# Patient Record
Sex: FEMALE | Race: BLACK OR AFRICAN AMERICAN | Employment: OTHER | URBAN - METROPOLITAN AREA
[De-identification: names, ages, dates, MRNs, and addresses within clinical notes are randomized per-mention and may not be internally consistent; named-entity substitution may affect disease eponyms.]

---

## 2017-06-15 ENCOUNTER — HOSPITAL ENCOUNTER (OUTPATIENT)
Dept: MAMMOGRAPHY | Age: 82
Discharge: HOME | End: 2017-06-15
Attending: FAMILY MEDICINE
Payer: MEDICARE

## 2017-06-15 DIAGNOSIS — Z12.31 VISIT FOR SCREENING MAMMOGRAM: ICD-10-CM

## 2017-06-15 PROCEDURE — 77067 SCR MAMMO BI INCL CAD: CPT

## 2018-01-02 ENCOUNTER — HOSPITAL ENCOUNTER (OUTPATIENT)
Dept: VASCULAR SURGERY | Age: 83
Discharge: HOME | End: 2018-01-02
Attending: FAMILY MEDICINE
Payer: MEDICARE

## 2018-01-02 DIAGNOSIS — I65.29 CAROTID ARTERY STENOSIS: ICD-10-CM

## 2018-01-02 PROCEDURE — 93880 EXTRACRANIAL BILAT STUDY: CPT

## 2018-01-02 NOTE — PROCEDURES
[facility]  *** FINAL REPORT ***    Name: Dora Talavera  MRN: [id number]    Outpatient  : 06 Mar 1934  HIS Order #: [id number]  [id number] [facility] Visit #: [id number]  Date: 2018    TYPE OF TEST: Cerebrovascular Duplex    REASON FOR TEST  Carotid artery stenosis    Right Carotid:-             Proximal               Mid                 Distal  cm/s  Systolic  Diastolic  Systolic  Diastolic  Systolic  Diastolic  CCA:     50.2                                      67.0  Bulb:  ECA:     62.0  ICA:     47.0      13.0                            67.0      22.0  ICA/CCA:  0.7    ICA Stenosis:    Right Vertebral:-  Finding: Antegrade  Sys:       76.0  Francesca:        9.0    Right Subclavian:    Left Carotid:-            Proximal                Mid                 Distal  cm/s  Systolic  Diastolic  Systolic  Diastolic  Systolic  Diastolic  CCA:     47.6                                      68.0  Bulb:  ECA:     67.0  ICA:     47.0      11.0                            88.0      24.0  ICA/CCA:  0.7    ICA Stenosis:    Left Vertebral:-  Finding: Antegrade  Sys:       55.0  Francesca:       13.0    Left Subclavian:    INTERPRETATION/FINDINGS  PROCEDURE:  Color duplex ultrasound imaging of extracranial  cerebrovascular arteries. FINDINGS:       Right:  Internal carotid velocity is within normal limits. There   is minimal narrowing of the internal carotid flow channel on color  Doppler imaging and minimal plaque at the internal carotid origin on  B-mode imaging, consistent with less than 50 percent stenosis (lower  portion of the 0 to 49 percent range). The common and external  carotid arteries are patent and without evidence of hemodynamically  significant stenosis. Left:  Internal carotid velocity is within normal limits, there  is no observed narrowing of the flow channel on color Doppler imaging,   and no plaque is visualized on B-mode imaging.   The common and  external carotid arteries are patent and without evidence of  hemodynamically significant stenosis. IMPRESSION:  Consistent with minimal or no stenosis of the right  internal carotid and no stenosis of the left internal carotid. Vertebrals are patent with antegrade flow. ADDITIONAL COMMENTS    I have personally reviewed the data relevant to the interpretation of  this  study.     TECHNOLOGIST: Lillie Nicole RVT  Signed: 01/02/2018 09:57 AM    PHYSICIAN: Mechelle Noble MD  Signed: 01/02/2018 12:18 PM

## 2018-06-19 ENCOUNTER — HOSPITAL ENCOUNTER (OUTPATIENT)
Dept: MAMMOGRAPHY | Age: 83
Discharge: HOME | End: 2018-06-19
Attending: FAMILY MEDICINE
Payer: MEDICARE

## 2018-06-19 DIAGNOSIS — Z12.31 VISIT FOR SCREENING MAMMOGRAM: ICD-10-CM

## 2018-06-19 PROCEDURE — 77063 BREAST TOMOSYNTHESIS BI: CPT

## 2019-07-11 ENCOUNTER — HOSPITAL ENCOUNTER (OUTPATIENT)
Dept: MAMMOGRAPHY | Age: 84
Discharge: HOME | End: 2019-07-11
Attending: FAMILY MEDICINE
Payer: MEDICARE

## 2019-07-11 DIAGNOSIS — Z12.39 SCREENING BREAST EXAMINATION: ICD-10-CM

## 2019-07-11 PROCEDURE — 77063 BREAST TOMOSYNTHESIS BI: CPT

## 2020-07-23 ENCOUNTER — HOSPITAL ENCOUNTER (OUTPATIENT)
Dept: MAMMOGRAPHY | Age: 85
Discharge: HOME | End: 2020-07-23
Attending: FAMILY MEDICINE
Payer: MEDICARE

## 2020-07-23 DIAGNOSIS — Z12.31 VISIT FOR SCREENING MAMMOGRAM: ICD-10-CM

## 2020-07-23 PROCEDURE — 77067 SCR MAMMO BI INCL CAD: CPT

## 2021-07-12 ENCOUNTER — TRANSCRIBE ORDER (OUTPATIENT)
Dept: SCHEDULING | Age: 86
End: 2021-07-12

## 2021-07-12 DIAGNOSIS — Z12.31 VISIT FOR SCREENING MAMMOGRAM: Primary | ICD-10-CM

## 2021-09-21 ENCOUNTER — HOSPITAL ENCOUNTER (OUTPATIENT)
Dept: MAMMOGRAPHY | Age: 86
Discharge: HOME | End: 2021-09-21
Attending: FAMILY MEDICINE
Payer: MEDICARE

## 2021-09-21 DIAGNOSIS — Z12.31 VISIT FOR SCREENING MAMMOGRAM: ICD-10-CM

## 2021-09-21 PROCEDURE — 77063 BREAST TOMOSYNTHESIS BI: CPT

## 2022-04-05 ENCOUNTER — HOSPITAL ENCOUNTER (INPATIENT)
Age: 87
LOS: 8 days | Discharge: NURSING, REHAB OR LONG-TERM CARE | DRG: 326 | End: 2022-04-14
Attending: EMERGENCY MEDICINE | Admitting: SURGERY
Payer: MEDICARE

## 2022-04-05 ENCOUNTER — APPOINTMENT (OUTPATIENT)
Dept: CT IMAGING | Age: 87
DRG: 326 | End: 2022-04-05
Attending: EMERGENCY MEDICINE
Payer: MEDICARE

## 2022-04-05 DIAGNOSIS — E86.0 MODERATE DEHYDRATION: ICD-10-CM

## 2022-04-05 DIAGNOSIS — R62.7 FAILURE TO THRIVE IN ADULT: ICD-10-CM

## 2022-04-05 DIAGNOSIS — K66.8 PNEUMOPERITONEUM: Primary | ICD-10-CM

## 2022-04-05 DIAGNOSIS — E83.52 HYPERCALCEMIA: ICD-10-CM

## 2022-04-05 PROCEDURE — 81001 URINALYSIS AUTO W/SCOPE: CPT

## 2022-04-05 PROCEDURE — 74176 CT ABD & PELVIS W/O CONTRAST: CPT

## 2022-04-05 PROCEDURE — 96374 THER/PROPH/DIAG INJ IV PUSH: CPT

## 2022-04-05 PROCEDURE — 96360 HYDRATION IV INFUSION INIT: CPT

## 2022-04-05 PROCEDURE — 99285 EMERGENCY DEPT VISIT HI MDM: CPT

## 2022-04-05 PROCEDURE — 96361 HYDRATE IV INFUSION ADD-ON: CPT

## 2022-04-05 PROCEDURE — 93005 ELECTROCARDIOGRAM TRACING: CPT

## 2022-04-05 PROCEDURE — 70450 CT HEAD/BRAIN W/O DYE: CPT

## 2022-04-06 ENCOUNTER — ANESTHESIA EVENT (OUTPATIENT)
Dept: SURGERY | Age: 87
DRG: 326 | End: 2022-04-06
Payer: MEDICARE

## 2022-04-06 ENCOUNTER — ANESTHESIA (OUTPATIENT)
Dept: SURGERY | Age: 87
DRG: 326 | End: 2022-04-06
Payer: MEDICARE

## 2022-04-06 ENCOUNTER — APPOINTMENT (OUTPATIENT)
Dept: GENERAL RADIOLOGY | Age: 87
DRG: 326 | End: 2022-04-06
Attending: SURGERY
Payer: MEDICARE

## 2022-04-06 ENCOUNTER — APPOINTMENT (OUTPATIENT)
Dept: GENERAL RADIOLOGY | Age: 87
DRG: 326 | End: 2022-04-06
Attending: EMERGENCY MEDICINE
Payer: MEDICARE

## 2022-04-06 PROBLEM — K25.5 PERFORATED GASTRIC ULCER (HCC): Status: ACTIVE | Noted: 2022-04-06

## 2022-04-06 LAB
ALBUMIN SERPL-MCNC: 3.7 G/DL (ref 3.5–5)
ALBUMIN/GLOB SERPL: 0.9 {RATIO} (ref 1.1–2.2)
ALP SERPL-CCNC: 52 U/L (ref 45–117)
ALT SERPL-CCNC: 26 U/L (ref 12–78)
ANION GAP SERPL CALC-SCNC: 16 MMOL/L (ref 5–15)
APPEARANCE UR: CLEAR
AST SERPL-CCNC: 34 U/L (ref 15–37)
ATRIAL RATE: 71 BPM
BACTERIA URNS QL MICRO: NEGATIVE /HPF
BASOPHILS # BLD: 0 K/UL (ref 0–0.1)
BASOPHILS NFR BLD: 0 % (ref 0–1)
BILIRUB SERPL-MCNC: 0.5 MG/DL (ref 0.2–1)
BILIRUB UR QL: NEGATIVE
BUN SERPL-MCNC: 24 MG/DL (ref 6–20)
BUN/CREAT SERPL: 17 (ref 12–20)
CA-I BLD-SCNC: 1.08 MMOL/L (ref 1.12–1.32)
CALCIUM SERPL-MCNC: 10.6 MG/DL (ref 8.5–10.1)
CALCIUM SERPL-MCNC: 10.8 MG/DL (ref 8.5–10.1)
CALCULATED P AXIS, ECG09: 54 DEGREES
CALCULATED R AXIS, ECG10: -63 DEGREES
CALCULATED T AXIS, ECG11: 112 DEGREES
CHLORIDE SERPL-SCNC: 102 MMOL/L (ref 97–108)
CO2 SERPL-SCNC: 27 MMOL/L (ref 21–32)
COLOR UR: ABNORMAL
COMMENT, HOLDF: NORMAL
COVID-19 RAPID TEST, COVR: NOT DETECTED
CREAT SERPL-MCNC: 1.38 MG/DL (ref 0.55–1.02)
DIAGNOSIS, 93000: NORMAL
DIFFERENTIAL METHOD BLD: ABNORMAL
EOSINOPHIL # BLD: 0.1 K/UL (ref 0–0.4)
EOSINOPHIL NFR BLD: 1 % (ref 0–7)
EPITH CASTS URNS QL MICRO: ABNORMAL /LPF
ERYTHROCYTE [DISTWIDTH] IN BLOOD BY AUTOMATED COUNT: 14.1 % (ref 11.5–14.5)
GLOBULIN SER CALC-MCNC: 4.1 G/DL (ref 2–4)
GLUCOSE BLD STRIP.AUTO-MCNC: 134 MG/DL (ref 65–117)
GLUCOSE SERPL-MCNC: 79 MG/DL (ref 65–100)
GLUCOSE UR STRIP.AUTO-MCNC: NEGATIVE MG/DL
HCT VFR BLD AUTO: 42.1 % (ref 35–47)
HGB BLD-MCNC: 13.3 G/DL (ref 11.5–16)
HGB UR QL STRIP: NEGATIVE
HYALINE CASTS URNS QL MICRO: ABNORMAL /LPF (ref 0–5)
IMM GRANULOCYTES # BLD AUTO: 0 K/UL (ref 0–0.04)
IMM GRANULOCYTES NFR BLD AUTO: 0 % (ref 0–0.5)
KETONES UR QL STRIP.AUTO: 15 MG/DL
LACTATE SERPL-SCNC: 2.1 MMOL/L (ref 0.4–2)
LEUKOCYTE ESTERASE UR QL STRIP.AUTO: NEGATIVE
LIPASE SERPL-CCNC: 186 U/L (ref 73–393)
LYMPHOCYTES # BLD: 1.1 K/UL (ref 0.8–3.5)
LYMPHOCYTES NFR BLD: 16 % (ref 12–49)
MAGNESIUM SERPL-MCNC: 1.8 MG/DL (ref 1.6–2.4)
MCH RBC QN AUTO: 29.7 PG (ref 26–34)
MCHC RBC AUTO-ENTMCNC: 31.6 G/DL (ref 30–36.5)
MCV RBC AUTO: 94 FL (ref 80–99)
MONOCYTES # BLD: 0.3 K/UL (ref 0–1)
MONOCYTES NFR BLD: 4 % (ref 5–13)
MUCOUS THREADS URNS QL MICRO: ABNORMAL /LPF
NEUTS SEG # BLD: 5.8 K/UL (ref 1.8–8)
NEUTS SEG NFR BLD: 79 % (ref 32–75)
NITRITE UR QL STRIP.AUTO: NEGATIVE
NRBC # BLD: 0 K/UL (ref 0–0.01)
NRBC BLD-RTO: 0 PER 100 WBC
P-R INTERVAL, ECG05: 160 MS
PH UR STRIP: 5.5 [PH] (ref 5–8)
PHOSPHATE SERPL-MCNC: 4.2 MG/DL (ref 2.6–4.7)
PLATELET # BLD AUTO: 214 K/UL (ref 150–400)
PMV BLD AUTO: 10.6 FL (ref 8.9–12.9)
POTASSIUM SERPL-SCNC: 3.5 MMOL/L (ref 3.5–5.1)
PROT SERPL-MCNC: 7.8 G/DL (ref 6.4–8.2)
PROT UR STRIP-MCNC: ABNORMAL MG/DL
PTH-INTACT SERPL-MCNC: 49.4 PG/ML (ref 18.4–88)
Q-T INTERVAL, ECG07: 512 MS
QRS DURATION, ECG06: 176 MS
QTC CALCULATION (BEZET), ECG08: 556 MS
RBC # BLD AUTO: 4.48 M/UL (ref 3.8–5.2)
RBC #/AREA URNS HPF: ABNORMAL /HPF (ref 0–5)
SAMPLES BEING HELD,HOLD: NORMAL
SERVICE CMNT-IMP: ABNORMAL
SODIUM SERPL-SCNC: 145 MMOL/L (ref 136–145)
SOURCE, COVRS: NORMAL
SP GR UR REFRACTOMETRY: >1.03 (ref 1–1.03)
TROPONIN-HIGH SENSITIVITY: 42 NG/L (ref 0–51)
TSH SERPL DL<=0.05 MIU/L-ACNC: 2.1 UIU/ML (ref 0.36–3.74)
UR CULT HOLD, URHOLD: NORMAL
UROBILINOGEN UR QL STRIP.AUTO: 0.2 EU/DL (ref 0.2–1)
VENTRICULAR RATE, ECG03: 71 BPM
WBC # BLD AUTO: 7.4 K/UL (ref 3.6–11)
WBC URNS QL MICRO: ABNORMAL /HPF (ref 0–4)

## 2022-04-06 PROCEDURE — C9113 INJ PANTOPRAZOLE SODIUM, VIA: HCPCS

## 2022-04-06 PROCEDURE — 77030002966 HC SUT PDS J&J -A: Performed by: SURGERY

## 2022-04-06 PROCEDURE — 77030003666 HC NDL SPINAL BD -A: Performed by: SURGERY

## 2022-04-06 PROCEDURE — 77030003029 HC SUT VCRL J&J -B: Performed by: SURGERY

## 2022-04-06 PROCEDURE — 83605 ASSAY OF LACTIC ACID: CPT

## 2022-04-06 PROCEDURE — 77030040361 HC SLV COMPR DVT MDII -B: Performed by: SURGERY

## 2022-04-06 PROCEDURE — 77030008462 HC STPLR SKN PROX J&J -A: Performed by: SURGERY

## 2022-04-06 PROCEDURE — 76060000034 HC ANESTHESIA 1.5 TO 2 HR: Performed by: SURGERY

## 2022-04-06 PROCEDURE — 74011000250 HC RX REV CODE- 250: Performed by: SURGERY

## 2022-04-06 PROCEDURE — 71046 X-RAY EXAM CHEST 2 VIEWS: CPT

## 2022-04-06 PROCEDURE — 49905 OMENTAL FLAP INTRA-ABDOM: CPT | Performed by: SURGERY

## 2022-04-06 PROCEDURE — 74011250637 HC RX REV CODE- 250/637: Performed by: SURGERY

## 2022-04-06 PROCEDURE — 84443 ASSAY THYROID STIM HORMONE: CPT

## 2022-04-06 PROCEDURE — 74011250636 HC RX REV CODE- 250/636: Performed by: NURSE ANESTHETIST, CERTIFIED REGISTERED

## 2022-04-06 PROCEDURE — 77030013079 HC BLNKT BAIR HGGR 3M -A: Performed by: NURSE ANESTHETIST, CERTIFIED REGISTERED

## 2022-04-06 PROCEDURE — 74011000258 HC RX REV CODE- 258: Performed by: EMERGENCY MEDICINE

## 2022-04-06 PROCEDURE — 83690 ASSAY OF LIPASE: CPT

## 2022-04-06 PROCEDURE — 83735 ASSAY OF MAGNESIUM: CPT

## 2022-04-06 PROCEDURE — 84484 ASSAY OF TROPONIN QUANT: CPT

## 2022-04-06 PROCEDURE — 74011250636 HC RX REV CODE- 250/636: Performed by: EMERGENCY MEDICINE

## 2022-04-06 PROCEDURE — 77030010507 HC ADH SKN DERMBND J&J -B: Performed by: SURGERY

## 2022-04-06 PROCEDURE — 0DU607Z SUPPLEMENT STOMACH WITH AUTOLOGOUS TISSUE SUBSTITUTE, OPEN APPROACH: ICD-10-PCS | Performed by: SURGERY

## 2022-04-06 PROCEDURE — 74011000250 HC RX REV CODE- 250: Performed by: ANESTHESIOLOGY

## 2022-04-06 PROCEDURE — 77030026438 HC STYL ET INTUB CARD -A: Performed by: NURSE ANESTHETIST, CERTIFIED REGISTERED

## 2022-04-06 PROCEDURE — C9113 INJ PANTOPRAZOLE SODIUM, VIA: HCPCS | Performed by: SURGERY

## 2022-04-06 PROCEDURE — 85025 COMPLETE CBC W/AUTO DIFF WBC: CPT

## 2022-04-06 PROCEDURE — 77030008602 HC TRCR ENDOSC EPATH J&J -B: Performed by: SURGERY

## 2022-04-06 PROCEDURE — 82962 GLUCOSE BLOOD TEST: CPT

## 2022-04-06 PROCEDURE — 77030031139 HC SUT VCRL2 J&J -A: Performed by: SURGERY

## 2022-04-06 PROCEDURE — 76210000006 HC OR PH I REC 0.5 TO 1 HR: Performed by: SURGERY

## 2022-04-06 PROCEDURE — 43840 GSTRRPHY SUTR DUOL/GSTR ULCR: CPT | Performed by: SURGERY

## 2022-04-06 PROCEDURE — 77030039895 HC SYST SMK EVAC LAP COVD -B: Performed by: SURGERY

## 2022-04-06 PROCEDURE — 74011250636 HC RX REV CODE- 250/636

## 2022-04-06 PROCEDURE — 86677 HELICOBACTER PYLORI ANTIBODY: CPT

## 2022-04-06 PROCEDURE — 77030040504 HC DRN WND MDII -B: Performed by: SURGERY

## 2022-04-06 PROCEDURE — 80053 COMPREHEN METABOLIC PANEL: CPT

## 2022-04-06 PROCEDURE — 77030008603 HC TRCR ENDOSC EPATH J&J -C: Performed by: SURGERY

## 2022-04-06 PROCEDURE — 77030040506 HC DRN WND MDII -A: Performed by: SURGERY

## 2022-04-06 PROCEDURE — 87635 SARS-COV-2 COVID-19 AMP PRB: CPT

## 2022-04-06 PROCEDURE — 77030002916 HC SUT ETHLN J&J -A: Performed by: SURGERY

## 2022-04-06 PROCEDURE — 76010000149 HC OR TIME 1 TO 1.5 HR: Performed by: SURGERY

## 2022-04-06 PROCEDURE — 74011000258 HC RX REV CODE- 258: Performed by: SURGERY

## 2022-04-06 PROCEDURE — 77030008684 HC TU ET CUF COVD -B: Performed by: NURSE ANESTHETIST, CERTIFIED REGISTERED

## 2022-04-06 PROCEDURE — 83970 ASSAY OF PARATHORMONE: CPT

## 2022-04-06 PROCEDURE — 74011250636 HC RX REV CODE- 250/636: Performed by: ANESTHESIOLOGY

## 2022-04-06 PROCEDURE — 84100 ASSAY OF PHOSPHORUS: CPT

## 2022-04-06 PROCEDURE — 77030008771 HC TU NG SALEM SUMP -A: Performed by: NURSE ANESTHETIST, CERTIFIED REGISTERED

## 2022-04-06 PROCEDURE — 82330 ASSAY OF CALCIUM: CPT

## 2022-04-06 PROCEDURE — 99221 1ST HOSP IP/OBS SF/LOW 40: CPT | Performed by: SURGERY

## 2022-04-06 PROCEDURE — 65270000032 HC RM SEMIPRIVATE

## 2022-04-06 PROCEDURE — 74011250636 HC RX REV CODE- 250/636: Performed by: SURGERY

## 2022-04-06 PROCEDURE — 36415 COLL VENOUS BLD VENIPUNCTURE: CPT

## 2022-04-06 PROCEDURE — 2709999900 HC NON-CHARGEABLE SUPPLY: Performed by: SURGERY

## 2022-04-06 PROCEDURE — 74018 RADEX ABDOMEN 1 VIEW: CPT

## 2022-04-06 PROCEDURE — 77030002933 HC SUT MCRYL J&J -A: Performed by: SURGERY

## 2022-04-06 PROCEDURE — 74011000250 HC RX REV CODE- 250: Performed by: NURSE ANESTHETIST, CERTIFIED REGISTERED

## 2022-04-06 RX ORDER — DEXTROSE, SODIUM CHLORIDE, SODIUM LACTATE, POTASSIUM CHLORIDE, AND CALCIUM CHLORIDE 5; .6; .31; .03; .02 G/100ML; G/100ML; G/100ML; G/100ML; G/100ML
125 INJECTION, SOLUTION INTRAVENOUS CONTINUOUS
Status: DISCONTINUED | OUTPATIENT
Start: 2022-04-06 | End: 2022-04-06 | Stop reason: HOSPADM

## 2022-04-06 RX ORDER — ONDANSETRON 2 MG/ML
4 INJECTION INTRAMUSCULAR; INTRAVENOUS AS NEEDED
Status: DISCONTINUED | OUTPATIENT
Start: 2022-04-06 | End: 2022-04-06 | Stop reason: HOSPADM

## 2022-04-06 RX ORDER — SODIUM CHLORIDE 0.9 % (FLUSH) 0.9 %
5-40 SYRINGE (ML) INJECTION AS NEEDED
Status: DISCONTINUED | OUTPATIENT
Start: 2022-04-06 | End: 2022-04-14 | Stop reason: HOSPADM

## 2022-04-06 RX ORDER — ACETAMINOPHEN 325 MG/1
650 TABLET ORAL ONCE
Status: DISCONTINUED | OUTPATIENT
Start: 2022-04-06 | End: 2022-04-06 | Stop reason: HOSPADM

## 2022-04-06 RX ORDER — FLUCONAZOLE 2 MG/ML
400 INJECTION, SOLUTION INTRAVENOUS EVERY 24 HOURS
Status: DISCONTINUED | OUTPATIENT
Start: 2022-04-06 | End: 2022-04-06

## 2022-04-06 RX ORDER — SODIUM CHLORIDE 0.9 % (FLUSH) 0.9 %
5-40 SYRINGE (ML) INJECTION EVERY 8 HOURS
Status: DISCONTINUED | OUTPATIENT
Start: 2022-04-06 | End: 2022-04-06 | Stop reason: HOSPADM

## 2022-04-06 RX ORDER — LIDOCAINE HYDROCHLORIDE 10 MG/ML
0.5 INJECTION, SOLUTION EPIDURAL; INFILTRATION; INTRACAUDAL; PERINEURAL AS NEEDED
Status: DISCONTINUED | OUTPATIENT
Start: 2022-04-06 | End: 2022-04-06 | Stop reason: HOSPADM

## 2022-04-06 RX ORDER — SODIUM CHLORIDE 0.9 % (FLUSH) 0.9 %
5-40 SYRINGE (ML) INJECTION AS NEEDED
Status: DISCONTINUED | OUTPATIENT
Start: 2022-04-06 | End: 2022-04-06 | Stop reason: HOSPADM

## 2022-04-06 RX ORDER — ONDANSETRON 2 MG/ML
INJECTION INTRAMUSCULAR; INTRAVENOUS AS NEEDED
Status: DISCONTINUED | OUTPATIENT
Start: 2022-04-06 | End: 2022-04-06 | Stop reason: HOSPADM

## 2022-04-06 RX ORDER — MIDAZOLAM HYDROCHLORIDE 1 MG/ML
1 INJECTION, SOLUTION INTRAMUSCULAR; INTRAVENOUS
Status: DISCONTINUED | OUTPATIENT
Start: 2022-04-06 | End: 2022-04-06 | Stop reason: HOSPADM

## 2022-04-06 RX ORDER — MIDAZOLAM HYDROCHLORIDE 1 MG/ML
1 INJECTION, SOLUTION INTRAMUSCULAR; INTRAVENOUS AS NEEDED
Status: DISCONTINUED | OUTPATIENT
Start: 2022-04-06 | End: 2022-04-06 | Stop reason: HOSPADM

## 2022-04-06 RX ORDER — BUPIVACAINE HYDROCHLORIDE AND EPINEPHRINE 5; 5 MG/ML; UG/ML
INJECTION, SOLUTION EPIDURAL; INTRACAUDAL; PERINEURAL AS NEEDED
Status: DISCONTINUED | OUTPATIENT
Start: 2022-04-06 | End: 2022-04-06 | Stop reason: HOSPADM

## 2022-04-06 RX ORDER — FENTANYL CITRATE 50 UG/ML
25 INJECTION, SOLUTION INTRAMUSCULAR; INTRAVENOUS
Status: DISCONTINUED | OUTPATIENT
Start: 2022-04-06 | End: 2022-04-06 | Stop reason: HOSPADM

## 2022-04-06 RX ORDER — DIPHENHYDRAMINE HYDROCHLORIDE 50 MG/ML
12.5 INJECTION, SOLUTION INTRAMUSCULAR; INTRAVENOUS AS NEEDED
Status: DISCONTINUED | OUTPATIENT
Start: 2022-04-06 | End: 2022-04-06 | Stop reason: HOSPADM

## 2022-04-06 RX ORDER — SODIUM CHLORIDE, SODIUM LACTATE, POTASSIUM CHLORIDE, CALCIUM CHLORIDE 600; 310; 30; 20 MG/100ML; MG/100ML; MG/100ML; MG/100ML
125 INJECTION, SOLUTION INTRAVENOUS CONTINUOUS
Status: DISCONTINUED | OUTPATIENT
Start: 2022-04-06 | End: 2022-04-06 | Stop reason: HOSPADM

## 2022-04-06 RX ORDER — FENTANYL CITRATE 50 UG/ML
50 INJECTION, SOLUTION INTRAMUSCULAR; INTRAVENOUS AS NEEDED
Status: DISCONTINUED | OUTPATIENT
Start: 2022-04-06 | End: 2022-04-06 | Stop reason: HOSPADM

## 2022-04-06 RX ORDER — ONDANSETRON 2 MG/ML
4 INJECTION INTRAMUSCULAR; INTRAVENOUS
Status: DISCONTINUED | OUTPATIENT
Start: 2022-04-06 | End: 2022-04-14 | Stop reason: HOSPADM

## 2022-04-06 RX ORDER — DEXAMETHASONE SODIUM PHOSPHATE 4 MG/ML
INJECTION, SOLUTION INTRA-ARTICULAR; INTRALESIONAL; INTRAMUSCULAR; INTRAVENOUS; SOFT TISSUE AS NEEDED
Status: DISCONTINUED | OUTPATIENT
Start: 2022-04-06 | End: 2022-04-06 | Stop reason: HOSPADM

## 2022-04-06 RX ORDER — DEXTROSE, SODIUM CHLORIDE, AND POTASSIUM CHLORIDE 5; .45; .15 G/100ML; G/100ML; G/100ML
125 INJECTION INTRAVENOUS CONTINUOUS
Status: DISCONTINUED | OUTPATIENT
Start: 2022-04-06 | End: 2022-04-11

## 2022-04-06 RX ORDER — SODIUM CHLORIDE 0.9 % (FLUSH) 0.9 %
5-40 SYRINGE (ML) INJECTION EVERY 8 HOURS
Status: DISCONTINUED | OUTPATIENT
Start: 2022-04-06 | End: 2022-04-14 | Stop reason: HOSPADM

## 2022-04-06 RX ORDER — SODIUM CHLORIDE, SODIUM LACTATE, POTASSIUM CHLORIDE, CALCIUM CHLORIDE 600; 310; 30; 20 MG/100ML; MG/100ML; MG/100ML; MG/100ML
INJECTION, SOLUTION INTRAVENOUS
Status: DISCONTINUED | OUTPATIENT
Start: 2022-04-06 | End: 2022-04-06 | Stop reason: HOSPADM

## 2022-04-06 RX ORDER — ROCURONIUM BROMIDE 10 MG/ML
INJECTION, SOLUTION INTRAVENOUS AS NEEDED
Status: DISCONTINUED | OUTPATIENT
Start: 2022-04-06 | End: 2022-04-06 | Stop reason: HOSPADM

## 2022-04-06 RX ORDER — MORPHINE SULFATE 2 MG/ML
2-4 INJECTION, SOLUTION INTRAMUSCULAR; INTRAVENOUS
Status: DISCONTINUED | OUTPATIENT
Start: 2022-04-06 | End: 2022-04-14 | Stop reason: HOSPADM

## 2022-04-06 RX ORDER — LIDOCAINE HYDROCHLORIDE 20 MG/ML
INJECTION, SOLUTION EPIDURAL; INFILTRATION; INTRACAUDAL; PERINEURAL AS NEEDED
Status: DISCONTINUED | OUTPATIENT
Start: 2022-04-06 | End: 2022-04-06 | Stop reason: HOSPADM

## 2022-04-06 RX ORDER — OXYCODONE HYDROCHLORIDE 5 MG/1
5 TABLET ORAL AS NEEDED
Status: DISCONTINUED | OUTPATIENT
Start: 2022-04-06 | End: 2022-04-06 | Stop reason: HOSPADM

## 2022-04-06 RX ORDER — ACETAMINOPHEN 650 MG/1
650 SUPPOSITORY RECTAL
Status: DISCONTINUED | OUTPATIENT
Start: 2022-04-06 | End: 2022-04-14 | Stop reason: HOSPADM

## 2022-04-06 RX ORDER — FENTANYL CITRATE 50 UG/ML
25 INJECTION, SOLUTION INTRAMUSCULAR; INTRAVENOUS
Status: DISCONTINUED | OUTPATIENT
Start: 2022-04-06 | End: 2022-04-06

## 2022-04-06 RX ORDER — MORPHINE SULFATE 2 MG/ML
2 INJECTION, SOLUTION INTRAMUSCULAR; INTRAVENOUS
Status: DISCONTINUED | OUTPATIENT
Start: 2022-04-06 | End: 2022-04-06 | Stop reason: HOSPADM

## 2022-04-06 RX ORDER — ACETAMINOPHEN 325 MG/1
650 TABLET ORAL
Status: DISCONTINUED | OUTPATIENT
Start: 2022-04-06 | End: 2022-04-14 | Stop reason: HOSPADM

## 2022-04-06 RX ORDER — POLYETHYLENE GLYCOL 3350 17 G/17G
17 POWDER, FOR SOLUTION ORAL DAILY PRN
Status: DISCONTINUED | OUTPATIENT
Start: 2022-04-06 | End: 2022-04-14 | Stop reason: HOSPADM

## 2022-04-06 RX ORDER — PANTOPRAZOLE SODIUM 40 MG/10ML
INJECTION, POWDER, LYOPHILIZED, FOR SOLUTION INTRAVENOUS
Status: COMPLETED
Start: 2022-04-06 | End: 2022-04-06

## 2022-04-06 RX ORDER — PROMETHAZINE HYDROCHLORIDE 25 MG/1
12.5 TABLET ORAL
Status: DISCONTINUED | OUTPATIENT
Start: 2022-04-06 | End: 2022-04-14 | Stop reason: HOSPADM

## 2022-04-06 RX ORDER — FLUCONAZOLE 2 MG/ML
200 INJECTION, SOLUTION INTRAVENOUS EVERY 24 HOURS
Status: DISCONTINUED | OUTPATIENT
Start: 2022-04-07 | End: 2022-04-11

## 2022-04-06 RX ORDER — FENTANYL CITRATE 50 UG/ML
INJECTION, SOLUTION INTRAMUSCULAR; INTRAVENOUS AS NEEDED
Status: DISCONTINUED | OUTPATIENT
Start: 2022-04-06 | End: 2022-04-06 | Stop reason: HOSPADM

## 2022-04-06 RX ORDER — PROPOFOL 10 MG/ML
INJECTION, EMULSION INTRAVENOUS AS NEEDED
Status: DISCONTINUED | OUTPATIENT
Start: 2022-04-06 | End: 2022-04-06 | Stop reason: HOSPADM

## 2022-04-06 RX ORDER — ONDANSETRON 2 MG/ML
4 INJECTION INTRAMUSCULAR; INTRAVENOUS
Status: DISCONTINUED | OUTPATIENT
Start: 2022-04-06 | End: 2022-04-09 | Stop reason: SDUPTHER

## 2022-04-06 RX ADMIN — FENTANYL CITRATE 25 MCG: 0.05 INJECTION, SOLUTION INTRAMUSCULAR; INTRAVENOUS at 03:14

## 2022-04-06 RX ADMIN — PIPERACILLIN SODIUM AND TAZOBACTAM SODIUM 3.38 G: 3; 375 INJECTION, POWDER, FOR SOLUTION INTRAVENOUS at 18:05

## 2022-04-06 RX ADMIN — PIPERACILLIN SODIUM AND TAZOBACTAM SODIUM 3.38 G: 3; 375 INJECTION, POWDER, FOR SOLUTION INTRAVENOUS at 11:05

## 2022-04-06 RX ADMIN — PANTOPRAZOLE SODIUM 40 MG: 40 INJECTION, POWDER, FOR SOLUTION INTRAVENOUS at 05:09

## 2022-04-06 RX ADMIN — DEXAMETHASONE SODIUM PHOSPHATE 4 MG: 4 INJECTION, SOLUTION INTRAMUSCULAR; INTRAVENOUS at 05:41

## 2022-04-06 RX ADMIN — MORPHINE SULFATE 2 MG: 2 INJECTION, SOLUTION INTRAMUSCULAR; INTRAVENOUS at 19:00

## 2022-04-06 RX ADMIN — FLUCONAZOLE 400 MG: 400 INJECTION, SOLUTION INTRAVENOUS at 05:21

## 2022-04-06 RX ADMIN — DEXTROSE MONOHYDRATE, SODIUM CHLORIDE, AND POTASSIUM CHLORIDE 125 ML/HR: 50; 4.5; 1.49 INJECTION, SOLUTION INTRAVENOUS at 07:11

## 2022-04-06 RX ADMIN — PIPERACILLIN AND TAZOBACTAM 3.38 G: 3; .375 INJECTION, POWDER, LYOPHILIZED, FOR SOLUTION INTRAVENOUS at 05:10

## 2022-04-06 RX ADMIN — FENTANYL CITRATE 25 MCG: 50 INJECTION, SOLUTION INTRAMUSCULAR; INTRAVENOUS at 07:35

## 2022-04-06 RX ADMIN — ROCURONIUM BROMIDE 35 MG: 10 SOLUTION INTRAVENOUS at 05:30

## 2022-04-06 RX ADMIN — FENTANYL CITRATE 50 MCG: 50 INJECTION, SOLUTION INTRAMUSCULAR; INTRAVENOUS at 05:30

## 2022-04-06 RX ADMIN — DEXTROSE MONOHYDRATE, SODIUM CHLORIDE, AND POTASSIUM CHLORIDE 125 ML/HR: 50; 4.5; 1.49 INJECTION, SOLUTION INTRAVENOUS at 16:05

## 2022-04-06 RX ADMIN — FENTANYL CITRATE 25 MCG: 50 INJECTION, SOLUTION INTRAMUSCULAR; INTRAVENOUS at 08:13

## 2022-04-06 RX ADMIN — SODIUM CHLORIDE, PRESERVATIVE FREE 10 ML: 5 INJECTION INTRAVENOUS at 07:51

## 2022-04-06 RX ADMIN — SODIUM CHLORIDE 1000 ML: 9 INJECTION, SOLUTION INTRAVENOUS at 00:43

## 2022-04-06 RX ADMIN — MORPHINE SULFATE 2 MG: 2 INJECTION, SOLUTION INTRAMUSCULAR; INTRAVENOUS at 13:43

## 2022-04-06 RX ADMIN — MORPHINE SULFATE 2 MG: 2 INJECTION, SOLUTION INTRAMUSCULAR; INTRAVENOUS at 22:45

## 2022-04-06 RX ADMIN — ONDANSETRON HYDROCHLORIDE 4 MG: 2 SOLUTION INTRAMUSCULAR; INTRAVENOUS at 13:43

## 2022-04-06 RX ADMIN — FENTANYL CITRATE 50 MCG: 50 INJECTION, SOLUTION INTRAMUSCULAR; INTRAVENOUS at 05:58

## 2022-04-06 RX ADMIN — ACETAMINOPHEN 650 MG: 650 SUPPOSITORY RECTAL at 13:44

## 2022-04-06 RX ADMIN — FENTANYL CITRATE 25 MCG: 50 INJECTION, SOLUTION INTRAMUSCULAR; INTRAVENOUS at 07:21

## 2022-04-06 RX ADMIN — MORPHINE SULFATE 2 MG: 2 INJECTION, SOLUTION INTRAMUSCULAR; INTRAVENOUS at 16:15

## 2022-04-06 RX ADMIN — PROPOFOL 100 MG: 10 INJECTION, EMULSION INTRAVENOUS at 05:30

## 2022-04-06 RX ADMIN — SODIUM CHLORIDE, POTASSIUM CHLORIDE, SODIUM LACTATE AND CALCIUM CHLORIDE: 600; 310; 30; 20 INJECTION, SOLUTION INTRAVENOUS at 05:15

## 2022-04-06 RX ADMIN — SODIUM CHLORIDE, PRESERVATIVE FREE 40 MG: 5 INJECTION INTRAVENOUS at 16:13

## 2022-04-06 RX ADMIN — SUGAMMADEX 200 MG: 100 INJECTION, SOLUTION INTRAVENOUS at 06:32

## 2022-04-06 RX ADMIN — ACETAMINOPHEN 650 MG: 325 TABLET ORAL at 22:45

## 2022-04-06 RX ADMIN — PHENYLEPHRINE HYDROCHLORIDE 40 MCG/MIN: 10 INJECTION INTRAVENOUS at 05:43

## 2022-04-06 RX ADMIN — ONDANSETRON HYDROCHLORIDE 4 MG: 2 INJECTION, SOLUTION INTRAMUSCULAR; INTRAVENOUS at 05:41

## 2022-04-06 RX ADMIN — SODIUM CHLORIDE 1000 ML: 9 INJECTION, SOLUTION INTRAVENOUS at 03:00

## 2022-04-06 RX ADMIN — ROCURONIUM BROMIDE 5 MG: 10 SOLUTION INTRAVENOUS at 05:51

## 2022-04-06 RX ADMIN — LIDOCAINE HYDROCHLORIDE 60 MG: 20 INJECTION, SOLUTION EPIDURAL; INFILTRATION; INTRACAUDAL; PERINEURAL at 05:30

## 2022-04-06 RX ADMIN — SODIUM CHLORIDE, PRESERVATIVE FREE 5 ML: 5 INJECTION INTRAVENOUS at 14:00

## 2022-04-06 NOTE — PROGRESS NOTES
Day #1 of Fluconazole  Indication:  IAI  Current regimen:  400 mg IV Q 24hrs   Abx regimen: Fluconazole, Zosyn  Recent Labs     22  0040   WBC 7.4   CREA 1.38*   BUN 24*   Est CrCl: 21.3 ml/min   Temp (24hrs), Av °F (37.2 °C), Min:97.6 °F (36.4 °C), Max:101 °F (38.3 °C)    Plan: Change to 200 mg IV Q 24hrs with respect to indication and renal status (CrCl <50 mL/min) per [facility]/[employer]-approved Renal Dosing Adjustment protocol. Pharmacy will continue to monitor this patient daily for changes in clinical status and renal function.     Tashia Bhatti, PharmD  Clinical Pharmacist, Orthopedics and Med/Surg  [id number] [employer] ()

## 2022-04-06 NOTE — ED NOTES
Care assumed from Dr. Ximena Butcher at [facility] ED transferring for evaluation by general surgery, Dr. Stacey Polk. She became mildly hypoxic after some fentanyl dosing while in route to the ED but otherwise no acute events in transfer. On my evaluation she is tender across her lower abdomen but in no distress. General surgery was consulted and saw the patient at the bedside plans to admit.
Pt given fentanyl prior to transport for pain 7/10.
Pt resting quietly in bed with eyes closed. Denies pain at this time. Family remains at bedside.
Pt to CT scan via stretcher with ct tech
Pt to room from CT scan via stretcher.
TRANSFER - OUT REPORT:    Verbal report given to [facility] Cooks, RN on Johan Robb  being transferred to [facility] ED for urgent transfer       Report consisted of patients Situation, Background, Assessment and   Recommendations(SBAR). Information from the following report(s) ED Summary was reviewed with the receiving nurse. Lines:   Peripheral IV 04/06/22 Right Antecubital (Active)       Peripheral IV 04/06/22 Left Forearm (Active)   Site Assessment Clean, dry, & intact 04/06/22 0253   Phlebitis Assessment 0 04/06/22 0253   Infiltration Assessment 0 04/06/22 0253   Dressing Status Clean, dry, & intact 04/06/22 0253   Dressing Type Transparent 04/06/22 0253   Hub Color/Line Status Pink;Flushed 04/06/22 0253   Action Taken Blood drawn 04/06/22 0253   Alcohol Cap Used Yes 04/06/22 0253        Opportunity for questions and clarification was provided.       Patient transported with:   Monitor
TRANSFER - OUT REPORT:    Verbal report given to Peyman Seals RN(name) on Jorge Mc  being transferred to OR(unit) for ordered procedure       Report consisted of patients Situation, Background, Assessment and   Recommendations(SBAR). Information from the following report(s) SBAR, ED Summary and MAR was reviewed with the receiving nurse. Lines:   Peripheral IV 04/06/22 Right Antecubital (Active)       Peripheral IV 04/06/22 Left Forearm (Active)   Site Assessment Clean, dry, & intact 04/06/22 0253   Phlebitis Assessment 0 04/06/22 0253   Infiltration Assessment 0 04/06/22 0253   Dressing Status Clean, dry, & intact 04/06/22 0253   Dressing Type Transparent 04/06/22 0253   Hub Color/Line Status Pink;Flushed 04/06/22 0253   Action Taken Blood drawn 04/06/22 0253   Alcohol Cap Used Yes 04/06/22 0253        Opportunity for questions and clarification was provided.       Patient transported with:   US Toxicology
age(85 years old or older)/surgery

## 2022-04-06 NOTE — PERIOP NOTES
TRANSFER - OUT REPORT:    Verbal report given to Alicia(name) on Herbert Ferguson  being transferred to [facility](unit) for routine post - op       Report consisted of patients Situation, Background, Assessment and   Recommendations(SBAR). Time Pre op antibiotic given:0510 & 2683  Anesthesia Stop time: 4824  Woody Present on Transfer to floor:n  Order for Woody on Chart:n  Discharge Prescriptions with Chart:n    Information from the following report(s) SBAR, OR Summary, Intake/Output, MAR and Accordion was reviewed with the receiving nurse. Opportunity for questions and clarification was provided. Is the patient on 02? YES       L/Min 2       Other     Is the patient on a monitor? NO    Is the nurse transporting with the patient? NO    Surgical Waiting Area notified of patient's transfer from PACU? YES      The following personal items collected during your admission accompanied patient upon transfer:   Dental Appliance: Dental Appliances: Uppers,Lowers,With patient  Vision: Visual Aid: Glasses,At home  Hearing Aid:    Jewelry: Jewelry: None  Clothing: Clothing: None  Other Valuables:  Other Valuables: None  Valuables sent to safe: Personal Items Sent to Safe: n/a      Dentures to floor with pt

## 2022-04-06 NOTE — BRIEF OP NOTE
Brief Postoperative Note    Patient: Jem Womack  YOB: 1934  MRN: [id number]    Date of Procedure: 4/6/2022     Pre-Op Diagnosis: Perforated Ulcer    Post-Op Diagnosis: Perforated gastric ulcer      Procedure(s):  Open modified Grahams repair of perforated ulcer repair    Surgeon(s):  Jaime Robbins MD    Surgical Assistant: Surg Asst-1: Nereida WARNER    Anesthesia: General     Estimated Blood Loss (mL): less than 50     Complications: None    Specimens: * No specimens in log *     Implants: * No implants in log *    Drains:   Shelia Montano #1 04/06/22 Anterior;Right Abdomen (Active)       Findings: Perforated prepyloric ulcer likely. 3 mm hole. Repaired in modified Froylan's patch fashion. Drain placed over repair.     Electronically Signed by Kelvin Agrawal MD on 4/6/2022 at 6:52 AM

## 2022-04-06 NOTE — H&P
General Surgery History and Physical    CC: Abd pain    History of Present Illness:      Herbert Ferguson is a 80 y.o. female who presented with severe abdominal pain. Patient reports that over the last week or so she has had worsening abdominal pain that became acutely painful yesterday. Pain is sharp and diffuse 9/10 prompting visit to the ER. Nausea but no emesis. Pain radiates throughout abdomen but seems to be worse in epigastrium. No NSAID use. History of bleeding gastric ulcers in past while on blood thinners. No H pylori history. No history of cancer. Has early dementia but functional at home with daughter. Past Medical History:   Diagnosis Date    Arthritis     Dementia ([facility] [address]     Depression     Essential hypertension     Headache     High cholesterol     Hypertension     Pacemaker      Past Surgical History:   Procedure Laterality Date    HX GI      EGD/colonoscopy    HX GYN      hysterectomy      Family History   Problem Relation Age of Onset    Breast Cancer Sister 58    Cancer Brother         lung    Heart Failure Brother     Breast Cancer Sister 66    Heart Failure Sister      Social History     Socioeconomic History    Marital status:    Tobacco Use    Smoking status: Never Smoker    Smokeless tobacco: Never Used   Substance and Sexual Activity    Alcohol use: No    Drug use: No    Sexual activity: Not Currently      Prior to Admission medications    Medication Sig Start Date End Date Taking? Authorizing Provider   ondansetron hcl (ZOFRAN) 4 mg tablet Take 4 mg by mouth every eight (8) hours as needed for Nausea. Provider, Historical   amLODIPine (NORVASC) 5 mg tablet Take 5 mg by mouth daily. Provider, Historical   sertraline (ZOLOFT) 100 mg tablet Take  by mouth daily. Provider, Historical   leflunomide (ARAVA) 20 mg tablet Take 20 mg by mouth daily. Provider, Historical   denosumab (PROLIA) 60 mg/mL injection 60 mg by SubCUTAneous route.     Provider, Historical   CALCIUM CARBONATE/VITAMIN D3 (CALCIUM 600 + D,3, PO) Take 600 mg by mouth. Takes 2 per day     Provider, Historical   MULTIVITAMINS W-MINERALS/LUT (CENTRUM SILVER PO) Take  by mouth daily. Provider, Historical   OTHER Osteo bi-flex. Takes two po once a day. Provider, Historical     Allergies   Allergen Reactions    Bactrim [Sulfamethoprim] Rash       Review of Systems:  Constitutional: No fever or chills  Neurologic: No headache  Eyes: No scleral icterus or irritated eyes  Nose: No nasal pain or drainage  Mouth: No oral lesions or sore throat  Cardiac: No palpations or chest pain  Pulmonary: No cough or shortness of breath  Gastrointestinal: Abd pain and nausea, emesis, diarrhea, or constipation  Genitourinary: No dysuria  Musculoskeletal: No muscle or joint tenderness  Skin: No rashes or lesions  Psychiatric: No anxiety or depressed mood    Physical Exam:   Temp (24hrs), Av.6 °F (36.4 °C), Min:97.6 °F (36.4 °C), Max:97.6 °F (36.4 °C)      Visit Vitals  BP (!) 150/62   Pulse 83   Temp 97.6 °F (36.4 °C)   Resp 19   Ht 5' 1\" (1.549 m)   Wt 109 lb 9.1 oz (49.7 kg)   SpO2 93%   BMI 20.70 kg/m²     General: No acute distress, conversant  Eyes: PERRLA, no scleral icterus  HENT: Normocephalic without oral lesions  Neck: Trachea midline without LAD  Cardiac: Normal pulse rate and rhythm  Pulmonary: Symmetric chest rise with normal effort  GI: Soft, no distension, TTP in epigastrium, no hernia, no splenomegaly  Skin: Warm without rash  Extremities: No edema or joint stiffness  Psych: Appropriate mood and affect    Assessment:     79 y/o F with concern for perforated foregut hollow viscus. Plan:     Discuss with family the options which include operative repair with patch, supportive care, or palliative care given her age and dementia. The family and patient would like to proceed with surgical intervention. Plan for dx laparoscopy, repair of perforation, possible laparotomy.  We discussed the risks of bleeding, infection, leak, abscess, need for open surgery, and the risks of anesthesia.  The patient and daughter expressed understanding and elect to proceed urgently this AM.      Signed By: Aston Cook MD  Bariatric and General Surgeon  [facility]    April 6, 2022

## 2022-04-06 NOTE — ANESTHESIA PREPROCEDURE EVALUATION
Relevant Problems   No relevant active problems       Anesthetic History               Review of Systems / Medical History      Pulmonary                   Neuro/Psych              Cardiovascular    Hypertension                   GI/Hepatic/Renal                Endo/Other        Arthritis     Other Findings              Physical Exam    Airway  Mallampati: II  TM Distance: > 6 cm  Neck ROM: normal range of motion   Mouth opening: Normal     Cardiovascular  Regular rate and rhythm,  S1 and S2 normal,  no murmur, click, rub, or gallop             Dental  No notable dental hx       Pulmonary  Breath sounds clear to auscultation               Abdominal  GI exam deferred       Other Findings            Anesthetic Plan    ASA: 3, emergent  Anesthesia type: general          Induction: Intravenous  Anesthetic plan and risks discussed with: Patient

## 2022-04-06 NOTE — PERIOP NOTES
TRANSFER - IN REPORT:    Verbal report received from YusufRN(name) on Soniya Rodríguez  being received from ED(unit) for routine progression of care      Report consisted of patients Situation, Background, Assessment and   Recommendations(SBAR). Information from the following report(s) ED Summary, MAR and Procedure Verification was reviewed with the receiving nurse. Opportunity for questions and clarification was provided. Assessment completed upon patients arrival to unit and care assumed.

## 2022-04-06 NOTE — ANESTHESIA POSTPROCEDURE EVALUATION
Procedure(s):  LAPAROSCOPY GENERAL DIAGNOSTIC, perforated ulcer repair. general    Anesthesia Post Evaluation        Patient location during evaluation: PACU  Patient participation: complete - patient participated  Level of consciousness: awake and alert  Pain management: adequate  Airway patency: patent  Anesthetic complications: no  Cardiovascular status: acceptable  Respiratory status: acceptable  Hydration status: acceptable  Comments: I have seen and evaluated the patient and is ready for discharge. Alvarado Siegel MD    Post anesthesia nausea and vomiting:  none      INITIAL Post-op Vital signs:   Vitals Value Taken Time   /69 04/06/22 0705   Temp 36.7 °C (98.1 °F) 04/06/22 0655   Pulse 76 04/06/22 0707   Resp 19 04/06/22 0707   SpO2 99 % 04/06/22 0707   Vitals shown include unvalidated device data.

## 2022-04-06 NOTE — OP NOTES
PROCEDURE NOTE    Date of Procedure: 4/6/2022     Preoperative Diagnosis: Perforated hollow viscus  Postoperative Diagnosis: Gastric antral ulcer perforation    Procedure: Diagnostic laparoscopy, open laparotomy, suture repair of perforated gastric ulcer with omental patch and drain placement    Surgeon: Debby Randhawa MD    Surgical Staff: Circ-1: Yeni Chu RN; April Espinosa  Scrub Tech-1: Nichol Sterling  Surg Asst-1: Anthony WARNER      Anesthesia: General    Indications: 80-year-old female presents with worsening abdominal pain over the last week. Found to have free air on CT scan taken to the OR for exploration. She has been taking steroids intermittently according to her daughter. No NSAIDs or H. pylori known of. Findings: Moderate purulence throughout the foregut, 3 mm prepyloric antral perforated ulcer    Description of Operation: French Torres was identified. Informed consent was obtained after a complete discussion of risks, benefits and alternatives to surgery were had with the patient. The patient was placed in the supine position on the operating table with a foot board. The patient was then prepped and draped in the usual sterile fashion. The patient was injected with local anesthesia in the infraumbilical area. I performed a cutdown using an 11 blade, Guadalupe S rectractors, Kocher's. I safely entered the abdomen. Medially I noted a fair amount of purulence in the bilateral upper quadrants. I elected then to go open to fully explore her abdomen. We removed the trocar and made a upper midline laparotomy connecting this to the previously placed trocar site. Upon retracting the left lobe the liver we found purulent exudate over the gastric antrum. I examined this further and found a 3 mm punctate anterior surface antral perforated ulcer.   I quickly examined the remainder of the stomach and duodenum I did not find any further disease process or perforation. I then placed for interrupted 2-0 Vicryl Lembert sutures to repair this hole. I then mobilized the omentum and brought this over the transverse colon and placed this over my suture repair performing an omental patch. I tacked the omentum in several places to the duodenum and stomach with 3-0 Vicryl sutures. I then placed a 23 Wolof round FILEMON drain via the right upper quadrant over my repair. We irrigated the abdomen. We secured the drain in place with a 2-0 nylon. We then closed the midline fascia with running 0 PDS suture followed by irrigation of the subcutaneous tissue, and skin staples and an island dressing. At the end of the procedure all instrument, needle, and sponge counts were correct.   Patient was sent to PACU in stable condition      Estimated Blood Loss: 10 mL    Specimens: * No specimens in log *     Complications: None    Implants: * No implants in log *      Alfred Mandel MD  Bariatric and General Surgeon  [facility]  4/6/2022

## 2022-04-06 NOTE — ED PROVIDER NOTES
The history is provided by the patient. Abdominal Pain   This is a new problem. Episode onset: 1 week ago. The problem occurs daily. The problem has not changed since onset. Associated with: loss of appetite, poor oral intake, weight loss. The pain is located in the generalized abdominal region. The pain is mild. Associated symptoms include anorexia, diarrhea (chronic loose stools) and nausea. Pertinent negatives include no fever, no hematochezia, no melena, no vomiting, no dysuria, no frequency and no chest pain. Nothing worsens the pain. The pain is relieved by nothing. The patient's surgical history includes hysterectomy.        Past Medical History:   Diagnosis Date    Arthritis     Dementia ([address]     Depression     Essential hypertension     Headache     High cholesterol     Hypertension     Pacemaker        Past Surgical History:   Procedure Laterality Date    HX GI      EGD/colonoscopy    HX GYN      hysterectomy         Family History:   Problem Relation Age of Onset    Breast Cancer Sister 58    Cancer Brother         lung    Heart Failure Brother     Breast Cancer Sister 66    Heart Failure Sister        Social History     Socioeconomic History    Marital status:      Spouse name: Not on file    Number of children: Not on file    Years of education: Not on file    Highest education level: Not on file   Occupational History    Not on file   Tobacco Use    Smoking status: Never Smoker    Smokeless tobacco: Never Used   Substance and Sexual Activity    Alcohol use: No    Drug use: No    Sexual activity: Not Currently   Other Topics Concern    Not on file   Social History Narrative    Not on file     Social Determinants of Health     Financial Resource Strain:     Difficulty of Paying Living Expenses: Not on file   Food Insecurity:     Worried About [address] in the Last Year: Not on file    Leeroy of Food in the Last Year: Not on file   Transportation Needs:  Lack of Transportation (Medical): Not on file    Lack of Transportation (Non-Medical): Not on file   Physical Activity:     Days of Exercise per Week: Not on file    Minutes of Exercise per Session: Not on file   Stress:     Feeling of Stress : Not on file   Social Connections:     Frequency of Communication with Friends and Family: Not on file    Frequency of Social Gatherings with Friends and Family: Not on file    Attends Sabianist Services: Not on file    Active Member of [address] [place] [address] or Organizations: Not on file    Attends Club or Organization Meetings: Not on file    Marital Status: Not on file   Intimate Partner Violence:     Fear of Current or Ex-Partner: Not on file    Emotionally Abused: Not on file    Physically Abused: Not on file    Sexually Abused: Not on file   Housing Stability:     Unable to Pay for Housing in the Last Year: Not on file    Number of [place] in the Last Year: Not on file    Unstable Housing in the Last Year: Not on file         ALLERGIES: Bactrim [sulfamethoprim]    Review of Systems   Constitutional: Negative for fever. Cardiovascular: Negative for chest pain. Gastrointestinal: Positive for abdominal pain, anorexia, diarrhea (chronic loose stools) and nausea. Negative for hematochezia, melena and vomiting. Genitourinary: Negative for dysuria and frequency. All other systems reviewed and are negative. Vitals:    04/05/22 2331 04/06/22 0252   BP: (!) 152/53 (!) 156/103   Pulse: 64 84   Resp: 16 22   Temp: 97.6 °F (36.4 °C)    SpO2: 92% 93%   Weight: 49.7 kg (109 lb 9.1 oz)    Height: 5' 1\" (1.549 m)             Physical Exam  Vitals and nursing note reviewed. Constitutional:       General: She is not in acute distress. Appearance: She is well-developed. HENT:      Head: Normocephalic and atraumatic. Eyes:      Conjunctiva/sclera: Conjunctivae normal.   Cardiovascular:      Rate and Rhythm: Normal rate and regular rhythm.       Heart sounds: Murmur (3/6 systolic) heard. No friction rub. No gallop. Pulmonary:      Effort: Pulmonary effort is normal. No respiratory distress. Breath sounds: Normal breath sounds. Abdominal:      General: There is no distension. Palpations: Abdomen is soft. Tenderness: There is abdominal tenderness (mild diffuse). There is no right CVA tenderness, left CVA tenderness, guarding or rebound. Negative signs include Garcia's sign and McBurney's sign. Hernia: No hernia is present. Musculoskeletal:         General: No deformity. Normal range of motion. Cervical back: Neck supple. Skin:     General: Skin is warm and dry. Neurological:      Mental Status: She is alert. Cranial Nerves: No cranial nerve deficit. Psychiatric:         Mood and Affect: Mood normal.         Behavior: Behavior normal.          [facility]  ED Course as of 04/06/22 0312 Wed Apr 06, 2022   0002 EKG 2354: Rate 71, atrial sensed ventricular pacing. No ST segment or T wave abnormalities. [DK]      ED Course User Index  [DK] Emeka Mills MD     80year-old female presents with general failure to thrive and weight loss with loss of appetite ongoing abdominal pain that is mostly upper but is diffuse in nature. Symptoms have been ongoing for about a week. Family became concerned when she started to lose weight. She is awake alert on arrival and in no acute distress. She does have some ongoing vague pain complaints. On work-up she is notably fairly dehydrated with modest elevation of BUN and creatinine as well as lactic acidosis that I suspect is secondary to volume loss rather than infection. She is on calcium supplementation and appears to be hypercalcemic which could also be secondary to poor intake but could contribute to malaise. Fluid resuscitated for hypercalcemia and dehydration.  Abdominal and chest imaging shows pneumoperitoneum with likely upper GI perforated viscus as source but unable to localize with CT. Prophylactic Zosyn ordered. Discussed with general surgery Dr. Cathy Bean who accepted the patient in transfer to [facility] for surgical evaluation. Dr. Kaylee Liu accepted the patient to the ED. Critical care transport was called for expedited transfer. Procedures    Critical Care Time: 33 minutes  I personally performed critical care time separate of billable procedures which may include ordering and interpretation of testing, ordering of medications, direct patient assessment and reassessment, discussion with consultants or family, and documentation.

## 2022-04-07 LAB
ANION GAP SERPL CALC-SCNC: 5 MMOL/L (ref 5–15)
BASOPHILS # BLD: 0 K/UL (ref 0–0.1)
BASOPHILS NFR BLD: 0 % (ref 0–1)
BUN SERPL-MCNC: 13 MG/DL (ref 6–20)
BUN/CREAT SERPL: 16 (ref 12–20)
CALCIUM SERPL-MCNC: 8.2 MG/DL (ref 8.5–10.1)
CHLORIDE SERPL-SCNC: 110 MMOL/L (ref 97–108)
CO2 SERPL-SCNC: 25 MMOL/L (ref 21–32)
CREAT SERPL-MCNC: 0.8 MG/DL (ref 0.55–1.02)
DIFFERENTIAL METHOD BLD: ABNORMAL
EOSINOPHIL # BLD: 0 K/UL (ref 0–0.4)
EOSINOPHIL NFR BLD: 0 % (ref 0–7)
ERYTHROCYTE [DISTWIDTH] IN BLOOD BY AUTOMATED COUNT: 14 % (ref 11.5–14.5)
GLUCOSE SERPL-MCNC: 96 MG/DL (ref 65–100)
H PYLORI IGA SER-ACNC: <9 UNITS (ref 0–8.9)
H PYLORI IGG SER IA-ACNC: 0.19 INDEX VALUE (ref 0–0.79)
HCT VFR BLD AUTO: 34.3 % (ref 35–47)
HGB BLD-MCNC: 11 G/DL (ref 11.5–16)
IMM GRANULOCYTES # BLD AUTO: 0.1 K/UL (ref 0–0.04)
IMM GRANULOCYTES NFR BLD AUTO: 1 % (ref 0–0.5)
LYMPHOCYTES # BLD: 0.7 K/UL (ref 0.8–3.5)
LYMPHOCYTES NFR BLD: 6 % (ref 12–49)
MCH RBC QN AUTO: 29.5 PG (ref 26–34)
MCHC RBC AUTO-ENTMCNC: 32.1 G/DL (ref 30–36.5)
MCV RBC AUTO: 92 FL (ref 80–99)
MONOCYTES # BLD: 0.8 K/UL (ref 0–1)
MONOCYTES NFR BLD: 7 % (ref 5–13)
NEUTS SEG # BLD: 9.3 K/UL (ref 1.8–8)
NEUTS SEG NFR BLD: 86 % (ref 32–75)
NRBC # BLD: 0 K/UL (ref 0–0.01)
NRBC BLD-RTO: 0 PER 100 WBC
PLATELET # BLD AUTO: 183 K/UL (ref 150–400)
PMV BLD AUTO: 10.3 FL (ref 8.9–12.9)
POTASSIUM SERPL-SCNC: 3.6 MMOL/L (ref 3.5–5.1)
RBC # BLD AUTO: 3.73 M/UL (ref 3.8–5.2)
RBC MORPH BLD: ABNORMAL
SODIUM SERPL-SCNC: 140 MMOL/L (ref 136–145)
WBC # BLD AUTO: 10.9 K/UL (ref 3.6–11)

## 2022-04-07 PROCEDURE — 36415 COLL VENOUS BLD VENIPUNCTURE: CPT

## 2022-04-07 PROCEDURE — 74011250636 HC RX REV CODE- 250/636: Performed by: SURGERY

## 2022-04-07 PROCEDURE — 85025 COMPLETE CBC W/AUTO DIFF WBC: CPT

## 2022-04-07 PROCEDURE — 99024 POSTOP FOLLOW-UP VISIT: CPT | Performed by: SURGERY

## 2022-04-07 PROCEDURE — 97530 THERAPEUTIC ACTIVITIES: CPT

## 2022-04-07 PROCEDURE — 77030038269 HC DRN EXT URIN PURWCK BARD -A

## 2022-04-07 PROCEDURE — 80048 BASIC METABOLIC PNL TOTAL CA: CPT

## 2022-04-07 PROCEDURE — 65270000032 HC RM SEMIPRIVATE

## 2022-04-07 PROCEDURE — 74011000250 HC RX REV CODE- 250: Performed by: SURGERY

## 2022-04-07 PROCEDURE — 97161 PT EVAL LOW COMPLEX 20 MIN: CPT

## 2022-04-07 PROCEDURE — 74011250637 HC RX REV CODE- 250/637: Performed by: SURGERY

## 2022-04-07 PROCEDURE — 74011000258 HC RX REV CODE- 258: Performed by: SURGERY

## 2022-04-07 PROCEDURE — C9113 INJ PANTOPRAZOLE SODIUM, VIA: HCPCS | Performed by: SURGERY

## 2022-04-07 RX ORDER — METOPROLOL TARTRATE 50 MG/1
50 TABLET ORAL
Status: DISCONTINUED | OUTPATIENT
Start: 2022-04-07 | End: 2022-04-08

## 2022-04-07 RX ORDER — LORAZEPAM 2 MG/ML
1 INJECTION INTRAMUSCULAR
Status: DISCONTINUED | OUTPATIENT
Start: 2022-04-07 | End: 2022-04-14 | Stop reason: HOSPADM

## 2022-04-07 RX ORDER — HYDRALAZINE HYDROCHLORIDE 20 MG/ML
20 INJECTION INTRAMUSCULAR; INTRAVENOUS
Status: DISCONTINUED | OUTPATIENT
Start: 2022-04-07 | End: 2022-04-07

## 2022-04-07 RX ORDER — AMLODIPINE BESYLATE 5 MG/1
5 TABLET ORAL DAILY
Status: DISCONTINUED | OUTPATIENT
Start: 2022-04-07 | End: 2022-04-08

## 2022-04-07 RX ORDER — DEXTROMETHORPHAN/PSEUDOEPHED 2.5-7.5/.8
20 DROPS ORAL
Status: DISCONTINUED | OUTPATIENT
Start: 2022-04-07 | End: 2022-04-14 | Stop reason: HOSPADM

## 2022-04-07 RX ADMIN — METOPROLOL TARTRATE 50 MG: 50 TABLET ORAL at 19:42

## 2022-04-07 RX ADMIN — ONDANSETRON HYDROCHLORIDE 4 MG: 2 SOLUTION INTRAMUSCULAR; INTRAVENOUS at 18:42

## 2022-04-07 RX ADMIN — FLUCONAZOLE 200 MG: 2 INJECTION, SOLUTION INTRAVENOUS at 11:31

## 2022-04-07 RX ADMIN — DEXTROSE MONOHYDRATE, SODIUM CHLORIDE, AND POTASSIUM CHLORIDE 125 ML/HR: 50; 4.5; 1.49 INJECTION, SOLUTION INTRAVENOUS at 19:38

## 2022-04-07 RX ADMIN — Medication 20 MG: at 07:26

## 2022-04-07 RX ADMIN — DEXTROSE MONOHYDRATE, SODIUM CHLORIDE, AND POTASSIUM CHLORIDE 125 ML/HR: 50; 4.5; 1.49 INJECTION, SOLUTION INTRAVENOUS at 11:31

## 2022-04-07 RX ADMIN — SODIUM CHLORIDE, PRESERVATIVE FREE 10 ML: 5 INJECTION INTRAVENOUS at 12:00

## 2022-04-07 RX ADMIN — PIPERACILLIN SODIUM AND TAZOBACTAM SODIUM 3.38 G: 3; 375 INJECTION, POWDER, FOR SOLUTION INTRAVENOUS at 18:15

## 2022-04-07 RX ADMIN — HYDRALAZINE HYDROCHLORIDE 20 MG: 20 INJECTION, SOLUTION INTRAMUSCULAR; INTRAVENOUS at 18:14

## 2022-04-07 RX ADMIN — SODIUM CHLORIDE, PRESERVATIVE FREE 40 MG: 5 INJECTION INTRAVENOUS at 17:17

## 2022-04-07 RX ADMIN — SODIUM CHLORIDE, PRESERVATIVE FREE 40 MG: 5 INJECTION INTRAVENOUS at 07:25

## 2022-04-07 RX ADMIN — LORAZEPAM 1 MG: 2 INJECTION INTRAMUSCULAR; INTRAVENOUS at 23:19

## 2022-04-07 RX ADMIN — SODIUM CHLORIDE, PRESERVATIVE FREE 10 ML: 5 INJECTION INTRAVENOUS at 22:00

## 2022-04-07 RX ADMIN — PIPERACILLIN SODIUM AND TAZOBACTAM SODIUM 3.38 G: 3; 375 INJECTION, POWDER, FOR SOLUTION INTRAVENOUS at 13:00

## 2022-04-07 RX ADMIN — Medication 20 MG: at 15:00

## 2022-04-07 RX ADMIN — PIPERACILLIN SODIUM AND TAZOBACTAM SODIUM 3.38 G: 3; 375 INJECTION, POWDER, FOR SOLUTION INTRAVENOUS at 04:37

## 2022-04-07 RX ADMIN — ACETAMINOPHEN 650 MG: 650 SUPPOSITORY RECTAL at 13:44

## 2022-04-07 NOTE — PROGRESS NOTES
Surgery Progress Note    4/7/2022    Admit Date: 4/5/2022    CC: Abdominal pain    POD: 1 open repair of perforated gastric ulcer    Subjective:     Pulled out her NG tube last night. Pain controlled. Pleasantly demented. Constitutional: No fever or chills  Neurologic: No headache  Eyes: No scleral icterus or irritated eyes  Nose: No nasal pain or drainage  Mouth: No oral lesions or sore throat  Cardiac: No palpations or chest pain  Pulmonary: No cough or shortness of breath  Gastrointestinal: Abdominal pain, no nausea, emesis, diarrhea, or constipation  Genitourinary: No dysuria  Musculoskeletal: No muscle or joint tenderness  Skin: No rashes or lesions  Psychiatric: No anxiety or depressed mood    Objective:     Visit Vitals  BP (!) 161/72   Pulse 95   Temp 98.8 °F (37.1 °C)   Resp 20   Ht 5' 1\" (1.549 m)   Wt 109 lb 9.1 oz (49.7 kg)   SpO2 92%   BMI 20.70 kg/m²       General: No acute distress, conversant  Eyes: PERRLA, no scleral icterus  HENT: Normocephalic without oral lesions  Neck: Trachea midline without LAD  Cardiac: Normal pulse rate and rhythm  Pulmonary: Symmetric chest rise with normal effort  GI: Soft, ATTP, wound cdi, drain serosang  Skin: Warm without rash  Extremities: No edema or joint stiffness  Psych: Appropriate mood and affect    Labs, vital signs, and I/O reviewed. Assessment:     63-year-old female doing well after open repair of perforated gastric ulcer    Plan:     PRN pain control  IVF  NPO, BID PPI  Cont abx and antigungal  Lovenox  Ambulate  CT Saturday.  If no leak can advance to maximum diet of francesco fulls for [address] MD  Bariatric and General Surgeon  [facility]

## 2022-04-07 NOTE — PROGRESS NOTES
RUR:  9% Low    JACKELINE:  Anticipated discharge home. PT eval. pending; awaiting discharge disposition recommendations. Daughter will provide transport home once medically stable. Follow-up with PCP/specialist.    Primary Contact: Daughter, Carmen Beltre, [phone]    Medicare pt has received, reviewed, and signed 1st IM letter informing them of their right to appeal the discharge. Signed copied has been placed on pt bedside chart. * Will need 2nd IM letter prior to discharge. Care Management Interventions  PCP Verified by CM: Yes (Dr. Horace Mccord, last seen Jan. 2022)  Mode of Transport at Discharge:  Other (see comment) (Daughter)  Transition of Care Consult (CM Consult): Discharge Planning  Discharge Durable Medical Equipment: No  Physical Therapy Consult: Yes  Occupational Therapy Consult: No  Speech Therapy Consult: No  Support Systems: Child(vero)  Confirm Follow Up Transport: Family  The Plan for Transition of Care is Related to the Following Treatment Goals : Home vs. rehab  Discharge Location  Patient Expects to be Discharged to[de-identified] Other: (Home vs. rehab, PT eval pending)    Reason for Admission:  Perforated gastric ulcer                    RUR Score:     9% Low                Plan for utilizing home health:    TBD, PT eval. pending      PCP: First and Last name:  Derrick Barajas DO     Name of Practice:  [facility]   Are you a current patient: Yes/No: Yes Approximate date of last visit: Jan. 2022   Can you participate in a virtual visit with your PCP: Yes                    Current Advanced Directive/Advance Care Plan: Full Code    Healthcare Decision Maker:   Click here to complete [address] including selection of the Healthcare Decision Maker Relationship (ie \"Primary\")             Primary Decision MakerSammy Rip Hansen - [phone]                  Transition of Care Plan:  Anticipate discharge home     CM met with patient and daughter at bedside to introduce self and explain role. Patient lives her daughter in a 1 story home with 4 steps to enter. Per daughter, patient was independent with ADL's and mostly independent with IADL's. Patient was no longer cooking, but was able to complete light housekeeping and laundry. Patient was ambulating with the use of a cane and/or rollator depending on amount of assistance needed. Patient was receiving outpatient therapy at [facility] on [address] [facility] 2x/week for PT. Patient's daughter will provide transportation home once medically stable. CM verified patient's PCP, demographics and insurance. Preferred pharmacy is [facility] on [address] in [facility] with no barriers obtaining needed prescriptions. CM to continue to follow as needed.     ROMERO Eaton   [phone]

## 2022-04-07 NOTE — ROUTINE PROCESS
Bedside and Verbal shift change report given to Suzy Brewer (oncoming nurse) by Anabelle Alfaro RN (offgoing nurse). Report included the following information SBAR, Kardex, OR Summary, Intake/Output, MAR and Recent Results.

## 2022-04-07 NOTE — PROGRESS NOTES
Problem: Mobility Impaired (Adult and Pediatric)  Goal: *Acute Goals and Plan of Care (Insert Text)  Description: FUNCTIONAL STATUS PRIOR TO ADMISSION: Patient is poor historian and no family bedside at time of evaluation. Per CM note: \"Per daughter, patient was independent with ADL's and mostly independent with IADL's. Patient was no longer cooking, but was able to complete light housekeeping and laundry. Patient was ambulating with the use of a cane and/or rollator depending on amount of assistance needed. Patient was receiving outpatient therapy at [facility] on [address] [facility] 2x/week for PT. \"    HOME SUPPORT PRIOR TO ADMISSION: The patient lived with daughter and required assist as needed with IADLs. Physical Therapy Goals  Initiated 4/7/2022  1. Patient will move from supine to sit and sit to supine  in bed with supervision/set-up while maintaining post-op precautions within 7 day(s). 2.  Patient will transfer from bed to chair and chair to bed with supervision/set-up using the least restrictive device within 7 day(s). 3.  Patient will perform sit to stand with supervision/set-up within 7 day(s). 4.  Patient will ambulate with minimal assistance/contact guard assist for 50 feet with the least restrictive device within 7 day(s). 5.  Patient will ascend/descend 4 stairs with unilateral handrail(s) with minimal assistance/contact guard assist within 7 day(s). Outcome: Progressing Towards Goal     PHYSICAL THERAPY EVALUATION  Patient: Florentino Crockett (29 y.o. female)  Date: 4/7/2022  Primary Diagnosis: Perforated gastric ulcer ([facility] [address] [K25.5]  Procedure(s) (LRB):  LAPAROSCOPY GENERAL DIAGNOSTIC, perforated ulcer repair (N/A) 1 Day Post-Op   Precautions:  Fall, post-op open abdominal surgery    ASSESSMENT  Patient with PMH of dementia admitted with perforated gastric ulcer and now s/p open repair of perforated gastric ulcer.  Based on the objective data described below, the patient presents with post-op pain/gas pain, impaired balance, impaired cognitive status, general weakness, and impaired activity tolerance. Patient is poor historian but per chart review, she lives with daughter but normally does not require assist with basic mobility with cane or walker or with ADLs. She is functioning well below her supposed baseline function requiring moderate to maximal assist with bed mobility and minimal to moderate assist to stand and take steps along edge of bed. Post-op pain/gas pain limiting all mobility and patient's ability to mobilize further this date. Discussed use of pillow to splint abdomen with patient for pain control and discussed with RN possibility of utilizing abdominal binder for pain control. Would benefit from trial of front wheeled walker for transfers and gait next session and further clarification of family's ability to assist patient. Would benefit from Acute OT consult to address ADLs and mobility in setting of recent surgery. At this time, she would benefit from subacute rehab to maximize functional potential and decrease caregiver burden. Anticipate slow but steady progress with improvement in post-op pain and progression of mobility. Acute PT will continue to follow and progress as able. Current Level of Function Impacting Discharge (mobility/balance): up to maximal assist with bed mobility    Other factors to consider for discharge: dementia at baseline, lives with daughter (unclear level of physical assist available)     Patient will benefit from skilled therapy intervention to address the above noted impairments. PLAN :  Recommendations and Planned Interventions: bed mobility training, transfer training, gait training, therapeutic exercises, neuromuscular re-education, patient and family training/education and therapeutic activities      Frequency/Duration: Patient will be followed by physical therapy:  5 times a week to address goals.     Recommendation for discharge: (in order for the patient to meet his/her long term goals)  Therapy up to 5 days/week in SNF setting    This discharge recommendation:  Has not yet been discussed the attending provider and/or case management    IF patient discharges home will need the following DME: to be determined (TBD)         SUBJECTIVE:   Patient stated You don't need to baby me. I know it is going to hurt.     OBJECTIVE DATA SUMMARY:   HISTORY:    Past Medical History:   Diagnosis Date    Arthritis     Dementia ([address]     Depression     Essential hypertension     Headache     High cholesterol     Hypertension     Pacemaker      Past Surgical History:   Procedure Laterality Date    HX GI      EGD/colonoscopy    HX GYN      hysterectomy       Personal factors and/or comorbidities impacting plan of care: dementia, lives with daughter (unclear level of physical assist available)    Home Situation  Home Environment: Private residence  # Steps to Enter: 4  One/Two Story Residence: One story  Support Systems: Child(vero)  Patient Expects to be Discharged to[de-identified] Other: (Home vs. rehab, PT eval pending)  Current DME Used/Available at Home: [address] [facility] [employer], Ne, straight,Walker, rollator    EXAMINATION/PRESENTATION/DECISION MAKING:   Critical Behavior:  Neurologic State: Alert  Orientation Level: Oriented to person,Disoriented to place,Disoriented to situation,Disoriented to time  Cognition: Poor safety awareness,Decreased attention/concentration (follows commands inconsistently with cues)     Hearing: Auditory  Auditory Impairment: Hearing aid(s)  Hearing Aids/Status:  At home    Skin:  Dressing to midline abdomen clean/dry/intact    Range Of Motion:  AROM: Generally decreased, functional  PROM: Generally decreased, functional    Strength:    Strength: Grossly decreased, non-functional    Tone & Sensation:   Tone: Normal  Sensation:  (unable to formally assess in setting of patient's status)    Coordination:  Coordination: Generally decreased, functional    Vision: Patient denies changes    Functional Mobility:  Bed Mobility:  Rolling: Moderate assistance; Additional time (verbal and tactile cues)  Supine to Sit: Maximum assistance; Additional time (verbal and tactile cues)  Sit to Supine: Moderate assistance; Additional time (verbal and tactile cues)     Transfers:  Sit to Stand: Moderate assistance; Additional time (cues)  Stand to Sit: Minimum assistance (cues)    Balance:   Sitting: Impaired  Sitting - Static: Fair (occasional) (attempting to return to supine due to pain)  Sitting - Dynamic: Fair (occasional); Unsupported  Standing: Impaired  Standing - Static: Constant support; Fair (bilateral handheld assist)  Standing - Dynamic : Constant support;Poor (bilateral handheld assist to take steps along edge of bed)    Ambulation/Gait Training:  Distance (ft): 2 Feet (ft)  Assistive Device:  (bilateral handheld assist)  Ambulation - Level of Assistance: Minimal assistance  Gait Description (WDL): Exceptions to WDL  Base of Support: Narrowed  Speed/Corrie: Shuffled        Physical Therapy Evaluation Charge Determination   History Examination Presentation Decision-Making   MEDIUM  Complexity : 1-2 comorbidities / personal factors will impact the outcome/ POC  MEDIUM Complexity : 3 Standardized tests and measures addressing body structure, function, activity limitation and / or participation in recreation  LOW Complexity : Stable, uncomplicated  LOW Complexity : FOTO score of       Based on the above components, the patient evaluation is determined to be of the following complexity level: LOW     Pain Rating:  Patient did not rate pain but reports gas pain and pain at abdomen with mobility limiting ability to participate    Activity Tolerance:   Fair and limited primarily by pain    After treatment patient left in no apparent distress:   Supine in bed, Call bell within reach and Bed / chair alarm activated    COMMUNICATION/EDUCATION:   The patients plan of care was discussed with: Registered nurse. Discussed recommendation for OT consult with RN. Fall prevention education was provided and the patient/caregiver reports understanding but will benefit from reinforcement. and Patient understands intent and goals of therapy, but is neutral about his/her participation.     Thank you for this referral.  Rashmi Roberson, PT   Time Calculation: 27 mins

## 2022-04-08 ENCOUNTER — APPOINTMENT (OUTPATIENT)
Dept: GENERAL RADIOLOGY | Age: 87
DRG: 326 | End: 2022-04-08
Attending: FAMILY MEDICINE
Payer: MEDICARE

## 2022-04-08 ENCOUNTER — APPOINTMENT (OUTPATIENT)
Dept: CT IMAGING | Age: 87
DRG: 326 | End: 2022-04-08
Attending: FAMILY MEDICINE
Payer: MEDICARE

## 2022-04-08 LAB
ANION GAP SERPL CALC-SCNC: 5 MMOL/L (ref 5–15)
ARTERIAL PATENCY WRIST A: ABNORMAL
BASE DEFICIT BLDA-SCNC: 0.7 MMOL/L
BASOPHILS # BLD: 0 K/UL (ref 0–0.1)
BASOPHILS NFR BLD: 0 % (ref 0–1)
BDY SITE: ABNORMAL
BNP SERPL-MCNC: ABNORMAL PG/ML
BUN SERPL-MCNC: 8 MG/DL (ref 6–20)
BUN/CREAT SERPL: 11 (ref 12–20)
CALCIUM SERPL-MCNC: 8.3 MG/DL (ref 8.5–10.1)
CHLORIDE SERPL-SCNC: 113 MMOL/L (ref 97–108)
CO2 SERPL-SCNC: 19 MMOL/L (ref 21–32)
COMMENT, HOLDF: NORMAL
CREAT SERPL-MCNC: 0.7 MG/DL (ref 0.55–1.02)
DIFFERENTIAL METHOD BLD: ABNORMAL
EOSINOPHIL # BLD: 0.1 K/UL (ref 0–0.4)
EOSINOPHIL NFR BLD: 1 % (ref 0–7)
ERYTHROCYTE [DISTWIDTH] IN BLOOD BY AUTOMATED COUNT: 14.3 % (ref 11.5–14.5)
GLUCOSE SERPL-MCNC: 103 MG/DL (ref 65–100)
HCO3 BLDA-SCNC: 21 MMOL/L (ref 22–26)
HCT VFR BLD AUTO: 33.4 % (ref 35–47)
HGB BLD-MCNC: 11 G/DL (ref 11.5–16)
IMM GRANULOCYTES # BLD AUTO: 0 K/UL (ref 0–0.04)
IMM GRANULOCYTES NFR BLD AUTO: 0 % (ref 0–0.5)
LACTATE SERPL-SCNC: 1.9 MMOL/L (ref 0.4–2)
LYMPHOCYTES # BLD: 0.7 K/UL (ref 0.8–3.5)
LYMPHOCYTES NFR BLD: 6 % (ref 12–49)
MCH RBC QN AUTO: 30.1 PG (ref 26–34)
MCHC RBC AUTO-ENTMCNC: 32.9 G/DL (ref 30–36.5)
MCV RBC AUTO: 91.3 FL (ref 80–99)
MONOCYTES # BLD: 0.6 K/UL (ref 0–1)
MONOCYTES NFR BLD: 5 % (ref 5–13)
NEUTS SEG # BLD: 9.9 K/UL (ref 1.8–8)
NEUTS SEG NFR BLD: 88 % (ref 32–75)
NRBC # BLD: 0 K/UL (ref 0–0.01)
NRBC BLD-RTO: 0 PER 100 WBC
PCO2 BLDA: 26 MMHG (ref 35–45)
PH BLDA: 7.52 [PH] (ref 7.35–7.45)
PLATELET # BLD AUTO: 172 K/UL (ref 150–400)
PMV BLD AUTO: 10.3 FL (ref 8.9–12.9)
PO2 BLDA: 68 MMHG (ref 80–100)
POTASSIUM SERPL-SCNC: 3.9 MMOL/L (ref 3.5–5.1)
PROCALCITONIN SERPL-MCNC: 2.45 NG/ML
RBC # BLD AUTO: 3.66 M/UL (ref 3.8–5.2)
RBC MORPH BLD: ABNORMAL
SAMPLES BEING HELD,HOLD: NORMAL
SAO2 % BLD: 96 % (ref 92–97)
SAO2% DEVICE SAO2% SENSOR NAME: ABNORMAL
SODIUM SERPL-SCNC: 137 MMOL/L (ref 136–145)
SPECIMEN SITE: ABNORMAL
TROPONIN-HIGH SENSITIVITY: 299 NG/L (ref 0–51)
WBC # BLD AUTO: 11.3 K/UL (ref 3.6–11)

## 2022-04-08 PROCEDURE — 83880 ASSAY OF NATRIURETIC PEPTIDE: CPT

## 2022-04-08 PROCEDURE — 85025 COMPLETE CBC W/AUTO DIFF WBC: CPT

## 2022-04-08 PROCEDURE — 74011250637 HC RX REV CODE- 250/637: Performed by: NURSE PRACTITIONER

## 2022-04-08 PROCEDURE — 82803 BLOOD GASES ANY COMBINATION: CPT

## 2022-04-08 PROCEDURE — 36415 COLL VENOUS BLD VENIPUNCTURE: CPT

## 2022-04-08 PROCEDURE — 97535 SELF CARE MNGMENT TRAINING: CPT

## 2022-04-08 PROCEDURE — 84484 ASSAY OF TROPONIN QUANT: CPT

## 2022-04-08 PROCEDURE — 80048 BASIC METABOLIC PNL TOTAL CA: CPT

## 2022-04-08 PROCEDURE — 99024 POSTOP FOLLOW-UP VISIT: CPT | Performed by: PHYSICIAN ASSISTANT

## 2022-04-08 PROCEDURE — 74011250636 HC RX REV CODE- 250/636: Performed by: FAMILY MEDICINE

## 2022-04-08 PROCEDURE — 74011250637 HC RX REV CODE- 250/637: Performed by: SURGERY

## 2022-04-08 PROCEDURE — 74011250636 HC RX REV CODE- 250/636: Performed by: SURGERY

## 2022-04-08 PROCEDURE — 74011000258 HC RX REV CODE- 258: Performed by: SURGERY

## 2022-04-08 PROCEDURE — 36600 WITHDRAWAL OF ARTERIAL BLOOD: CPT

## 2022-04-08 PROCEDURE — C9113 INJ PANTOPRAZOLE SODIUM, VIA: HCPCS | Performed by: SURGERY

## 2022-04-08 PROCEDURE — 74011000250 HC RX REV CODE- 250: Performed by: FAMILY MEDICINE

## 2022-04-08 PROCEDURE — 74018 RADEX ABDOMEN 1 VIEW: CPT

## 2022-04-08 PROCEDURE — 97165 OT EVAL LOW COMPLEX 30 MIN: CPT

## 2022-04-08 PROCEDURE — 84145 PROCALCITONIN (PCT): CPT

## 2022-04-08 PROCEDURE — 83605 ASSAY OF LACTIC ACID: CPT

## 2022-04-08 PROCEDURE — 74011000250 HC RX REV CODE- 250: Performed by: SURGERY

## 2022-04-08 PROCEDURE — 93005 ELECTROCARDIOGRAM TRACING: CPT

## 2022-04-08 PROCEDURE — 77030038269 HC DRN EXT URIN PURWCK BARD -A

## 2022-04-08 PROCEDURE — 74011250637 HC RX REV CODE- 250/637: Performed by: FAMILY MEDICINE

## 2022-04-08 PROCEDURE — 97530 THERAPEUTIC ACTIVITIES: CPT

## 2022-04-08 PROCEDURE — 70450 CT HEAD/BRAIN W/O DYE: CPT

## 2022-04-08 PROCEDURE — 71045 X-RAY EXAM CHEST 1 VIEW: CPT

## 2022-04-08 PROCEDURE — 65660000001 HC RM ICU INTERMED STEPDOWN

## 2022-04-08 RX ORDER — ASPIRIN 300 MG/1
150 SUPPOSITORY RECTAL ONCE
Status: COMPLETED | OUTPATIENT
Start: 2022-04-08 | End: 2022-04-08

## 2022-04-08 RX ORDER — HYDRALAZINE HYDROCHLORIDE 20 MG/ML
10 INJECTION INTRAMUSCULAR; INTRAVENOUS
Status: DISCONTINUED | OUTPATIENT
Start: 2022-04-08 | End: 2022-04-08

## 2022-04-08 RX ORDER — LABETALOL HYDROCHLORIDE 5 MG/ML
20 INJECTION, SOLUTION INTRAVENOUS ONCE
Status: COMPLETED | OUTPATIENT
Start: 2022-04-08 | End: 2022-04-08

## 2022-04-08 RX ORDER — IPRATROPIUM BROMIDE AND ALBUTEROL SULFATE 2.5; .5 MG/3ML; MG/3ML
3 SOLUTION RESPIRATORY (INHALATION)
Status: DISCONTINUED | OUTPATIENT
Start: 2022-04-08 | End: 2022-04-14 | Stop reason: HOSPADM

## 2022-04-08 RX ORDER — HYDRALAZINE HYDROCHLORIDE 50 MG/1
50 TABLET, FILM COATED ORAL
Status: DISCONTINUED | OUTPATIENT
Start: 2022-04-08 | End: 2022-04-14 | Stop reason: HOSPADM

## 2022-04-08 RX ORDER — FUROSEMIDE 10 MG/ML
40 INJECTION INTRAMUSCULAR; INTRAVENOUS ONCE
Status: COMPLETED | OUTPATIENT
Start: 2022-04-08 | End: 2022-04-08

## 2022-04-08 RX ORDER — AMLODIPINE BESYLATE 5 MG/1
7.5 TABLET ORAL DAILY
Status: DISCONTINUED | OUTPATIENT
Start: 2022-04-09 | End: 2022-04-09

## 2022-04-08 RX ADMIN — SODIUM CHLORIDE, PRESERVATIVE FREE 40 MG: 5 INJECTION INTRAVENOUS at 17:35

## 2022-04-08 RX ADMIN — DEXTROSE MONOHYDRATE, SODIUM CHLORIDE, AND POTASSIUM CHLORIDE 125 ML/HR: 50; 4.5; 1.49 INJECTION, SOLUTION INTRAVENOUS at 15:03

## 2022-04-08 RX ADMIN — METOPROLOL TARTRATE 50 MG: 50 TABLET ORAL at 10:44

## 2022-04-08 RX ADMIN — AMLODIPINE BESYLATE 5 MG: 5 TABLET ORAL at 09:22

## 2022-04-08 RX ADMIN — PIPERACILLIN SODIUM AND TAZOBACTAM SODIUM 3.38 G: 3; 375 INJECTION, POWDER, FOR SOLUTION INTRAVENOUS at 18:47

## 2022-04-08 RX ADMIN — FLUCONAZOLE 200 MG: 2 INJECTION, SOLUTION INTRAVENOUS at 05:58

## 2022-04-08 RX ADMIN — SODIUM CHLORIDE, PRESERVATIVE FREE 10 ML: 5 INJECTION INTRAVENOUS at 06:00

## 2022-04-08 RX ADMIN — LABETALOL HYDROCHLORIDE 20 MG: 5 INJECTION INTRAVENOUS at 17:29

## 2022-04-08 RX ADMIN — PIPERACILLIN SODIUM AND TAZOBACTAM SODIUM 3.38 G: 3; 375 INJECTION, POWDER, FOR SOLUTION INTRAVENOUS at 03:42

## 2022-04-08 RX ADMIN — SODIUM CHLORIDE, PRESERVATIVE FREE 10 ML: 5 INJECTION INTRAVENOUS at 14:00

## 2022-04-08 RX ADMIN — PIPERACILLIN SODIUM AND TAZOBACTAM SODIUM 3.38 G: 3; 375 INJECTION, POWDER, FOR SOLUTION INTRAVENOUS at 10:45

## 2022-04-08 RX ADMIN — HYDRALAZINE HYDROCHLORIDE 50 MG: 50 TABLET, FILM COATED ORAL at 12:01

## 2022-04-08 RX ADMIN — FUROSEMIDE 40 MG: 10 INJECTION, SOLUTION INTRAMUSCULAR; INTRAVENOUS at 18:46

## 2022-04-08 RX ADMIN — SODIUM CHLORIDE, PRESERVATIVE FREE 40 MG: 5 INJECTION INTRAVENOUS at 05:58

## 2022-04-08 RX ADMIN — SODIUM CHLORIDE, PRESERVATIVE FREE 10 ML: 5 INJECTION INTRAVENOUS at 22:00

## 2022-04-08 RX ADMIN — DEXTROSE MONOHYDRATE, SODIUM CHLORIDE, AND POTASSIUM CHLORIDE 125 ML/HR: 50; 4.5; 1.49 INJECTION, SOLUTION INTRAVENOUS at 03:43

## 2022-04-08 RX ADMIN — ASPIRIN 150 MG: 300 SUPPOSITORY RECTAL at 18:46

## 2022-04-08 NOTE — PROGRESS NOTES
Surgery Progress Note    Admit Date: 4/5/2022      Subjective:      Pt complains of some abdominal pain, she is confused but in good spirits, tells me overall she is feeling ok  Pts pain present - adequately treated. Pt tells me she has not CP, feels that she is breathing ok . Pt is ambulating. Patient 's current diet is NPO. Melinda Yan Pt reports  no nausea. Pt reports no fever or chills    Bowel Movements: pt cannot tell me, she is unsure         Objective:     Patient Vitals for the past 8 hrs:   BP Temp Pulse Resp SpO2   04/08/22 0736 (!) 169/93 98.5 °F (36.9 °C) 81 16 95 %     No intake/output data recorded. 04/06 1901 - 04/08 0700  In: 40   Out: 585 [Urine:425; Drains:160]ip  Physical Exam:    General: alert, confused, no distress  Cardiac: normal S1 and S2  Lungs: Normal chest wall and respirations. Clear to auscultation.   Abdomen: soft, nondistended, normal bowel sounds, nontender, without guarding, without rebound  Wounds:surgical dressing in place, FILEMON is ss output,   Neuro: confused, interactive   Extremities: no edema      CBC:   Lab Results   Component Value Date/Time    WBC 11.3 (H) 04/08/2022 04:55 AM    RBC 3.66 (L) 04/08/2022 04:55 AM    HGB 11.0 (L) 04/08/2022 04:55 AM    HCT 33.4 (L) 04/08/2022 04:55 AM    PLATELET 103 50/52/1094 04:55 AM     BMP:   Lab Results   Component Value Date/Time    Glucose 103 (H) 04/08/2022 04:55 AM    Sodium 137 04/08/2022 04:55 AM    Potassium 3.9 04/08/2022 04:55 AM    Chloride 113 (H) 04/08/2022 04:55 AM    CO2 19 (L) 04/08/2022 04:55 AM    BUN 8 04/08/2022 04:55 AM    Creatinine 0.70 04/08/2022 04:55 AM    Calcium 8.3 (L) 04/08/2022 04:55 AM     CMP:  Lab Results   Component Value Date/Time    Glucose 103 (H) 04/08/2022 04:55 AM    Sodium 137 04/08/2022 04:55 AM    Potassium 3.9 04/08/2022 04:55 AM    Chloride 113 (H) 04/08/2022 04:55 AM    CO2 19 (L) 04/08/2022 04:55 AM    BUN 8 04/08/2022 04:55 AM    Creatinine 0.70 04/08/2022 04:55 AM    Calcium 8.3 (L) 04/08/2022 04:55 AM    Anion gap 5 04/08/2022 04:55 AM    BUN/Creatinine ratio 11 (L) 04/08/2022 04:55 AM    Alk.  phosphatase 52 04/06/2022 12:40 AM    Protein, total 7.8 04/06/2022 12:40 AM    Albumin 3.7 04/06/2022 12:40 AM    Globulin 4.1 (H) 04/06/2022 12:40 AM    A-G Ratio 0.9 (L) 04/06/2022 12:40 AM       Radiology review: na         Assessment:   Pt is POD #2 s/p Procedure(s):  LAPAROSCOPY GENERAL DIAGNOSTIC, perforated ulcer repair    HTN  Dementia   Plan:   Diet: NPO  Activity: up with assistance  Pain management  GI and DVT prophylaxis  Drains Continue FILEMON drain, ss output this morning   Meds: continue norvasc for HTN   Labs: labs are reviewed, up to date and stable   purewick in place   Antibiotics: Zosyn and diflucan   Pt is to have CT tomorrow to evaluate gastric repair--it has been ordered by Dr Kristopher Garcia , if OK she may start liquid diet  over the weekend   Further plan per Dr. Wanda Osgood covering for Dr. Kristopher Garcia today   Beth David PA-C

## 2022-04-08 NOTE — PROGRESS NOTES
One time order for IV metoprolol, order remote tele for pt. Pt started wheezing, notified Dr. Kianna Clarke via [facility]. MD ordered ABG, BNP, and troponin Q6x2. Currently getting stat CXR and KUB. Bedside shift change report given to Selvin Beyer (oncoming nurse) by Tina Coto (offgoing nurse). Report included the following information SBAR, Kardex and MAR.

## 2022-04-08 NOTE — PROGRESS NOTES
Consult called for sob   patient has been seen by [facility]  Discussed with Dr. Gurwinder Vizcarra redirected to [facility]

## 2022-04-08 NOTE — PROGRESS NOTES
Patient's BP elevated, called and spoke with Dr. Eliazar Ramos, received order for Hydralazine. Medicated with Hydralazine, then patient had side effects of flushing, dizzy, elevated HR. Gave patient cold washcloth for her face, instructed her to take deep breaths, BP still elevated. Called and spoke with Dr. Eliazar Ramos regarding the above, received new medication orders and to discontinue the hydralazine. About an hour after the hydralazine, patient started to feel better, no more dizziness, flushing. Patient's children at bedside, discussed the above with them.

## 2022-04-08 NOTE — H&P
[facility] Adult  Hospitalist Group  Consult note    Date of Service:  4/8/2022  Primary Care Provider: Annemarie Thomas DO  Source of information: The patient    Chief Complaint: Abdominal Pain      History of Presenting Illness:   Magalis Harris is a 80 y.o. female who presents with abdominal pain, patient admitted to surgical service, patient was found to have perforated gastric ulcer and underwent surgical intervention,    Hospital service was consulted today for elevated blood pressure. Went to evaluate the patient, patient slightly confused, not able to provide much history, reports some shortness of breath, per nursing staff blood pressure has been running between 772R and 147K systolic, nursing staff also reports that patient got some IV hydralazine yesterday and there were some concerns for allergic reaction, patient denies any other complaints or problems. REVIEW OF SYSTEMS:  Pertinent items are noted in the History of Present Illness. Past Medical History:   Diagnosis Date    Arthritis     Dementia ([facility])     Depression     Essential hypertension     Headache     High cholesterol     Hypertension     Pacemaker       Past Surgical History:   Procedure Laterality Date    HX GI      EGD/colonoscopy    HX GYN      hysterectomy     Prior to Admission medications    Medication Sig Start Date End Date Taking? Authorizing Provider   ondansetron hcl (ZOFRAN) 4 mg tablet Take 4 mg by mouth every eight (8) hours as needed for Nausea. Provider, Historical   amLODIPine (NORVASC) 5 mg tablet Take 5 mg by mouth daily. Provider, Historical   sertraline (ZOLOFT) 100 mg tablet Take  by mouth daily. Provider, Historical   leflunomide (ARAVA) 20 mg tablet Take 20 mg by mouth daily. Provider, Historical   denosumab (PROLIA) 60 mg/mL injection 60 mg by SubCUTAneous route. Provider, Historical   CALCIUM CARBONATE/VITAMIN D3 (CALCIUM 600 + D,3, PO) Take 600 mg by mouth.  Takes 2 per day     Provider, Historical   MULTIVITAMINS W-MINERALS/LUT (CENTRUM SILVER PO) Take  by mouth daily. Provider, Historical   OTHER Osteo bi-flex. Takes two po once a day. Provider, Historical     Allergies   Allergen Reactions    Bactrim [Sulfamethoprim] Rash      Family History   Problem Relation Age of Onset    Breast Cancer Sister 58    Cancer Brother         lung    Heart Failure Brother     Breast Cancer Sister 66    Heart Failure Sister       Social History:  reports that she has never smoked. She has never used smokeless tobacco. She reports that she does not drink alcohol and does not use drugs. Family and social history were personally reviewed, all pertinent and relevant details are outlined as above. Objective:     Visit Vitals  BP (!) 166/78   Pulse 82   Temp 98.7 °F (37.1 °C)   Resp 16   Ht 5' 1\" (1.549 m)   Wt 49.7 kg (109 lb 9.1 oz)   SpO2 91%   BMI 20.70 kg/m²    O2 Flow Rate (L/min): 2 l/min O2 Device: None (Room air)    PHYSICAL EXAM:   General: Awake, slightly confused  HEENT: PEERL, moist mucus membranes  Neck: Supple,   Chest: Decreased basal breath sound with diffuse wheezes throughout lung field  CVS: S1-S2 heard  Abd: Soft, mildly tender to palpation/no rebound/no guarding  Ext: No clubbing, no cyanosis, no edema  Neuro/Psych: No focal neurological deficit but limited exam      Data Review: All diagnostic labs and studies have been reviewed.     Abnormal Labs Reviewed   CBC WITH AUTOMATED DIFF - Abnormal; Notable for the following components:       Result Value    NEUTROPHILS 79 (*)     MONOCYTES 4 (*)     All other components within normal limits   METABOLIC PANEL, COMPREHENSIVE - Abnormal; Notable for the following components:    Anion gap 16 (*)     BUN 24 (*)     Creatinine 1.38 (*)     GFR est AA 44 (*)     GFR est non-AA 36 (*)     Calcium 10.6 (*)     Globulin 4.1 (*)     A-G Ratio 0.9 (*)     All other components within normal limits   LACTIC ACID - Abnormal; Notable for the following components:    Lactic acid 2.1 (*)     All other components within normal limits   URINALYSIS W/MICROSCOPIC - Abnormal; Notable for the following components:    Specific gravity >1.030 (*)     Protein TRACE (*)     Ketone 15 (*)     Mucus 1+ (*)     All other components within normal limits   PTH INTACT - Abnormal; Notable for the following components:    Calcium 10.8 (*)     All other components within normal limits   METABOLIC PANEL, BASIC - Abnormal; Notable for the following components:    Chloride 110 (*)     Calcium 8.2 (*)     All other components within normal limits   CBC WITH AUTOMATED DIFF - Abnormal; Notable for the following components:    RBC 3.73 (*)     HGB 11.0 (*)     HCT 34.3 (*)     NEUTROPHILS 86 (*)     LYMPHOCYTES 6 (*)     IMMATURE GRANULOCYTES 1 (*)     ABS. NEUTROPHILS 9.3 (*)     ABS. LYMPHOCYTES 0.7 (*)     ABS. IMM. GRANS. 0.1 (*)     All other components within normal limits   METABOLIC PANEL, BASIC - Abnormal; Notable for the following components:    Chloride 113 (*)     CO2 19 (*)     Glucose 103 (*)     BUN/Creatinine ratio 11 (*)     Calcium 8.3 (*)     All other components within normal limits   CBC WITH AUTOMATED DIFF - Abnormal; Notable for the following components:    WBC 11.3 (*)     RBC 3.66 (*)     HGB 11.0 (*)     HCT 33.4 (*)     NEUTROPHILS 88 (*)     LYMPHOCYTES 6 (*)     ABS. NEUTROPHILS 9.9 (*)     ABS.  LYMPHOCYTES 0.7 (*)     All other components within normal limits   IONIZED CALCIUM - Abnormal; Notable for the following components:    Calcium, ionized 1.08 (*)     All other components within normal limits   GLUCOSE, POC - Abnormal; Notable for the following components:    Glucose (POC) 134 (*)     All other components within normal limits       All Micro Results     Procedure Component Value Units Date/Time    COVID-19 RAPID TEST [[id number]] Collected: 04/06/22 0248    Order Status: Completed Specimen: Nasopharyngeal Updated: 04/06/22 8457 Specimen source Nasopharyngeal        COVID-19 rapid test Not detected        Comment: Rapid Abbott ID Now       Rapid NAAT:  The specimen is NEGATIVE for SARS-CoV-2, the novel coronavirus associated with COVID-19. Negative results should be treated as presumptive and, if inconsistent with clinical signs and symptoms or necessary for patient management, should be tested with an alternative molecular assay. Negative results do not preclude SARS-CoV-2 infection and should not be used as the sole basis for patient management decisions. This test has been authorized by the FDA under an Emergency Use Authorization (EUA) for use by authorized laboratories. Fact sheet for Healthcare Providers: Lively[employer]nz  Fact sheet for Patients: [employer]       Methodology: Isothermal Nucleic Acid Amplification         URINE CULTURE HOLD SAMPLE [[id number]] Collected: 04/05/22 2340    Order Status: Completed Specimen: Urine from Serum Updated: 04/06/22 0034     Urine culture hold       Urine on hold in Microbiology dept for 2 days. If unpreserved urine is submitted, it cannot be used for addtional testing after 24 hours, recollection will be required. IMAGING:   XR ABD (KUB)   Final Result   1. Gastric tube likely terminates in the stomach. 2. Postsurgical changes                     XR CHEST PA LAT   Final Result   1. Likely chronic lung change with diffuse interstitial prominence. In the   alternative, this may represent pulmonary vascular congestion or atypical   infectious process. 2. Likely pneumoperitoneum. CT HEAD WO CONT   Final Result   1. No evidence of acute intracranial abnormality by this modality. CT ABD PELV WO CONT   Final Result   1. There is pneumoperitoneum without a definite source identified. There are   locules of gas adjacent to the mendoza hepatis suggesting an upper abdominal   source.    2. Incidental findings as above. CT ABD PELV W CONT    (Results Pending)   XR CHEST PORT    (Results Pending)   XR ABD (KUB)    (Results Pending)        ECG/ECHO:    Results for orders placed or performed during the hospital encounter of 04/05/22   EKG, 12 LEAD, INITIAL   Result Value Ref Range    Ventricular Rate 85 BPM    Atrial Rate 85 BPM    P-R Interval 190 ms    QRS Duration 158 ms    Q-T Interval 450 ms    QTC Calculation (Bezet) 535 ms    Calculated P Axis 84 degrees    Calculated R Axis -58 degrees    Calculated T Axis 120 degrees    Diagnosis       Normal sinus rhythm  Left axis deviation  Left ventricular hypertrophy with QRS widening  Inferior infarct , age undetermined  Abnormal ECG  When compared with ECG of 05-APR-2022 23:54,  Sinus rhythm has replaced Electronic ventricular pacemaker          Assessment:   Given the patient's current clinical presentation, there is a high level of concern for decompensation if discharged from the emergency department. Complex decision making was performed, which includes reviewing the patient's available past medical records, laboratory results, and imaging studies. Patient with abdominal pain, POD #2 status post laparoscopic perforated ulcer repair  Accelerated hypertension  Shortness of breath  Non-anion gap metabolic acidosis    Plan:   Labetalol 20 mg IV x1 dose, increase Norvasc to 7.5 mg daily, patient with shortness of breath, chest x-ray, lactic acid, EKG, pro-David, troponin and telemetry monitoring, if persist may consider further intervention and diagnostics  Unclear etiology for shortness of breath, chest x-ray, ABG, patient with bilateral diffuse wheezing, duo nebs, aspiration precautions, encourage I-S, monitor  Bicarb GTT, ABG, monitor            Signed By: Stephen Ledbetter MD     April 8, 2022         Please note that this dictation may have been completed with Suri Solo, the computer voice recognition software.   Quite often unanticipated grammatical, syntax, homophones, and other interpretive errors are inadvertently transcribed by the computer software. Please disregard these errors. Please excuse any errors that have escaped final proofreading.

## 2022-04-08 NOTE — PROGRESS NOTES
Attempted to call report to [address] [place], [place] [address] but nurse is just coming in and getting report / shift change so unable to take report at this time. 2028 -- second attempt to call report and nurse still not available to take report. This nurse again told that nurse would call me back. 2100 -report called to [facility] nurse. Pt to be moved in bed by PCT's. TRANSFER - OUT REPORT:    Verbal report given to KAM rivera (name) on Jorge Mc  being transferred to  [facility] (unit) for urgent transfer       Report consisted of patients Situation, Background, Assessment and   Recommendations(SBAR). Information from the following report(s) SBAR and Kardex was reviewed with the receiving nurse. Lines:   Peripheral IV 04/07/22 Left Forearm (Active)   Site Assessment Clean, dry, & intact 04/08/22 0925   Phlebitis Assessment 0 04/08/22 0925   Infiltration Assessment 0 04/08/22 0925   Dressing Status Clean, dry, & intact 04/08/22 0925   Dressing Type Transparent 04/08/22 0925   Hub Color/Line Status Infusing 04/08/22 0172        Opportunity for questions and clarification was provided.       Patient transported with:   Monitor

## 2022-04-08 NOTE — PROGRESS NOTES
Problem: Self Care Deficits Care Plan (Adult)  Goal: *Acute Goals and Plan of Care (Insert Text)  Description: FUNCTIONAL STATUS PRIOR TO ADMISSION: Spoke with pt's son who reported pt received assistance with ADLs and used RW or cane for functional mobility at home. HOME SUPPORT: The patient lived with daughter and required moderate assistance  for ADLs. Occupational Therapy Goals  Initiated 4/8/2022  1. Patient will perform self-feeding with supervision/set-up within 7 day(s). 2.  Patient will perform bathing from anterior neck to thigh with minimal assistance/contact guard assist within 7 day(s). 3.  Patient will perform upper body dressing with minimal assistance/contact guard assist within 7 day(s). 4.  Patient will perform toilet transfers with moderate assistance  within 7 day(s). 5.  Patient will perform all aspects of toileting with maximal assistance within 7 day(s). 6.  Patient will participate in upper extremity therapeutic exercise/activities with minimal assistance/contact guard assist for 10 minutes within 7 day(s). 7.  Patient will utilize energy conservation techniques during functional activities with verbal cues within 7 day(s). Outcome: Not Met     OCCUPATIONAL THERAPY EVALUATION  Patient: Jorge Mc (76 y.o. female)  Date: 4/8/2022  Primary Diagnosis: Perforated gastric ulcer ([facility] [address] [K25.5]  Procedure(s) (LRB):  LAPAROSCOPY GENERAL DIAGNOSTIC, perforated ulcer repair (N/A) 2 Days Post-Op   Precautions:   Fall    ASSESSMENT  Based on the objective data described below, the patient presents with pain with mobility, generalized weakness, decreased activity tolerance, confusion and lethargy following admission for perforated ulcer repair post-op day 2. Pt cleared for therapy by nursing and received supine in bed, lethargic and confused. When asked where she was pt responded she was in the kitchen. Pt drowsy throughout session, but participatory and following commands.  Pt requiring gown and linen change due to incontinence. Completed bed mobility with Mod Ax2 to sit EOB with significant abdominal pain during movement. Pt sat EOB demo'ing impaired static seated balance. Completed sit>stand with RW and Mod A. Pt demo'd ability to side step to head of bed with RW and Min A. At end of session returned to supine and Mod Ax2. Currently recommend SNF at discharge to increase safety and independence in ADLs and functional mobility. Current Level of Function Impacting Discharge (ADLs/self-care): Mod Ax2 for bed mobility, Mod-Max A for ADLs, Confusion     Functional Outcome Measure: The patient scored Total: 20/100 on the Barthel Index outcome measure which is indicative of being dependenct in basic self-care. Other factors to consider for discharge: Lives with daughter      Patient will benefit from skilled therapy intervention to address the above noted impairments. PLAN :  Recommendations and Planned Interventions: self care training, functional mobility training, therapeutic exercise, balance training, therapeutic activities, endurance activities, patient education, home safety training and family training/education    Frequency/Duration: Patient will be followed by occupational therapy 5 times a week to address goals. Recommendation for discharge: (in order for the patient to meet his/her long term goals)  Therapy up to 5 days/week in SNF setting    This discharge recommendation:  Has been made in collaboration with the attending provider and/or case management    IF patient discharges home will need the following DME: patient owns DME required for discharge       SUBJECTIVE:   Patient stated I'm in the kitchen.     OBJECTIVE DATA SUMMARY:   HISTORY:   Past Medical History:   Diagnosis Date    Arthritis     Dementia ([facility] [address]     Depression     Essential hypertension     Headache     High cholesterol     Hypertension     Pacemaker      Past Surgical History: Procedure Laterality Date    HX GI      EGD/colonoscopy    HX GYN      hysterectomy       Expanded or extensive additional review of patient history:     Home Situation  Home Environment: Private residence  # Steps to Enter: 4  One/Two Story Residence: One story  Support Systems: Child(vero)  Patient Expects to be Discharged to[de-identified] Home with home health  Current DME Used/Available at Home: Carlos Snyder, lena,Walker, rollator        EXAMINATION OF PERFORMANCE DEFICITS:  Cognitive/Behavioral Status:  Neurologic State: Confused  Orientation Level: Oriented to person;Disoriented to place; Disoriented to situation;Disoriented to time  Cognition: Decreased attention/concentration  Perception: Appears intact  Perseveration: No perseveration noted  Safety/Judgement: Decreased awareness of environment;Decreased insight into deficits; Decreased awareness of need for safety    Skin: dry, intact     Edema: none noted     Hearing: Auditory  Auditory Impairment: Hearing aid(s)  Hearing Aids/Status: At home    Vision/Perceptual:              Range of Motion:    AROM: Generally decreased, functional  PROM: Generally decreased, functional      Strength:    Strength: Generally decreased, functional     Coordination:  Coordination: Generally decreased, functional  Fine Motor Skills-Upper: Left Intact; Right Intact    Gross Motor Skills-Upper: Left Intact; Right Intact    Tone & Sensation:    Tone: Normal  Sensation: Intact     Balance:  Sitting: Impaired  Sitting - Static: Fair (occasional)  Sitting - Dynamic: Fair (occasional)  Standing: Impaired  Standing - Static: Constant support; Fair  Standing - Dynamic : Constant support; Fair    Functional Mobility and Transfers for ADLs:  Bed Mobility:  Supine to Sit: Moderate assistance;Assist x2  Sit to Supine: Moderate assistance;Assist x2  Scooting: Maximum assistance    Transfers:  Sit to Stand:  Moderate assistance  Stand to Sit: Minimum assistance  Assistive Device : Walker, rolling    ADL Assessment:  Feeding: Setup;Minimum assistance    Oral Facial Hygiene/Grooming: Setup;Minimum assistance    Bathing: Maximum assistance    Upper Body Dressing: Maximum assistance    Lower Body Dressing: Maximum assistance    Toileting: Maximum assistance        ADL Intervention and task modifications:          Cognitive Retraining  Safety/Judgement: Decreased awareness of environment;Decreased insight into deficits; Decreased awareness of need for safety      Functional Measure:    Barthel Index:  Bathin  Bladder: 5  Bowels: 5  Groomin  Dressin  Feedin  Mobility: 0  Stairs: 0  Toilet Use: 0  Transfer (Bed to Chair and Back): 5  Total: 20/100      The Barthel ADL Index: Guidelines  1. The index should be used as a record of what a patient does, not as a record of what a patient could do. 2. The main aim is to establish degree of independence from any help, physical or verbal, however minor and for whatever reason. 3. The need for supervision renders the patient not independent. 4. A patient's performance should be established using the best available evidence. Asking the patient, friends/relatives and nurses are the usual sources, but direct observation and common sense are also important. However direct testing is not needed. 5. Usually the patient's performance over the preceding 24-48 hours is important, but occasionally longer periods will be relevant. 6. Middle categories imply that the patient supplies over 50 per cent of the effort. 7. Use of aids to be independent is allowed. Score Interpretation (from [address] [facility] [address])    Independent   60-79 Minimally independent   40-59 Partially dependent   20-39 Very dependent   <20 Totally dependent     -Katina Sepulveda., Barthel, D.W. (1965). Functional evaluation: the Barthel Index. [address] ([address] ().  The Barthel activities of daily living index: self-reporting versus actual performance in the old (> or = 75 years). Journal of [address] [place] [address] 457, [facility], [facility], Cintron Dear., Tony Woodson. (1999). Measuring the change in disability after inpatient rehabilitation; comparison of the responsiveness of the Barthel Index and Functional Campbell Measure. Journal of Neurology, Neurosurgery, and Psychiatry, 66(4), 939-881. TEOFILO Valdivia, HANNA Diallo, & Yadira Lorenzana M.A. (2004) Assessment of post-stroke quality of life in cost-effectiveness studies: The usefulness of the Barthel Index and the EuroQoL-5D. Quality of Life Research, 15, 488-91     Occupational Therapy Evaluation Charge Determination   History Examination Decision-Making   LOW Complexity : Brief history review  LOW Complexity : 1-3 performance deficits relating to physical, cognitive , or psychosocial skils that result in activity limitations and / or participation restrictions  LOW Complexity : No comorbidities that affect functional and no verbal or physical assistance needed to complete eval tasks       Based on the above components, the patient evaluation is determined to be of the following complexity level: LOW   Pain Rating:  Pain at abdomin with mobility     Activity Tolerance:   requires rest breaks    After treatment patient left in no apparent distress:    Supine in bed, Call bell within reach, Bed / chair alarm activated, Caregiver / family present and Side rails x 3    COMMUNICATION/EDUCATION:   The patients plan of care was discussed with: Physical therapist, Registered nurse and Case management. Home safety education was provided and the patient/caregiver indicated understanding. This patients plan of care is appropriate for delegation to [facility].     Thank you for this referral.  Jinny Frank OT  Time Calculation: 27 mins

## 2022-04-08 NOTE — PROGRESS NOTES
9337: given scheduled Norvasc    1039: BP recheck was 188/95    1044: given prn metoprolol    1115: BP recheck was 180/85    1119: perfectserve Rasheeda Azul NP. 50mg PO hydralazine PRN added    1201: given PO hydralazine    1239: BP recheck 172/99.  perfectserve to Rasheeda Azul NP    1325: hospitalist consult added per Rasheeda Azul NP

## 2022-04-08 NOTE — PROGRESS NOTES
Patient with elevated troponin, elevated BNP, concern for flash pulmonary edema, hold fluids, IV Lasix, cardiology consult, discussed with surgical team if patient can have aspirin suppository

## 2022-04-08 NOTE — PROGRESS NOTES
Verbal shift change report given to Zina Zhao  (oncoming nurse) by Cyn Helms  (offgoing nurse). Report included the following information SBAR at 1600 pm, minimal report received and many new orders pending to be done on this pt. Lab work, CT of the head, EKG, multiple new medications, etc., due to new consults for AMS and HTN issues. Blood work drawn an sent on pt then Dr Marquis Campbell has entered more lab work to be drawn. Also MD has ordered CT of HEAD and wants pt to travel with nurse and monitor. 1730 -- spoke with MD about above order of pt needing to travel with nurse and monitor, and if pt needed higher level of care. MD states that pt needs nurse to travel with her as there is suspicion for brain bleed, thaat if nurse felt that pt didn't need this then to document. Asked if pt needed to be moved to higher level of care? DR Marquis Campbell states that it is not his call, the nurse supervisor needs to make that call.

## 2022-04-08 NOTE — PROGRESS NOTES
Problem: Mobility Impaired (Adult and Pediatric)  Goal: *Acute Goals and Plan of Care (Insert Text)  Description: FUNCTIONAL STATUS PRIOR TO ADMISSION: Patient is poor historian and no family bedside at time of evaluation. Per CM note: \"Per daughter, patient was independent with ADL's and mostly independent with IADL's. Patient was no longer cooking, but was able to complete light housekeeping and laundry. Patient was ambulating with the use of a cane and/or rollator depending on amount of assistance needed. Patient was receiving outpatient therapy at [facility] on [address] [facility] 2x/week for PT. \"    HOME SUPPORT PRIOR TO ADMISSION: The patient lived with daughter and required assist as needed with IADLs. Physical Therapy Goals  Initiated 4/7/2022  1. Patient will move from supine to sit and sit to supine  in bed with supervision/set-up while maintaining post-op precautions within 7 day(s). 2.  Patient will transfer from bed to chair and chair to bed with supervision/set-up using the least restrictive device within 7 day(s). 3.  Patient will perform sit to stand with supervision/set-up within 7 day(s). 4.  Patient will ambulate with minimal assistance/contact guard assist for 50 feet with the least restrictive device within 7 day(s). 5.  Patient will ascend/descend 4 stairs with unilateral handrail(s) with minimal assistance/contact guard assist within 7 day(s). Outcome: Progressing Towards Goal     PHYSICAL THERAPY TREATMENT  Patient: Melvin Score (60 y.o. female)  Date: 4/8/2022  Diagnosis: Perforated gastric ulcer ([facility] [address] [K25.5] <principal problem not specified>  Procedure(s) (LRB):  LAPAROSCOPY GENERAL DIAGNOSTIC, perforated ulcer repair (N/A) 2 Days Post-Op  Precautions: Fall  Chart, physical therapy assessment, plan of care and goals were reviewed. ASSESSMENT  Patient continues with skilled PT services and is slowly progressing towards goals.  Patient continues to be functioning well below functional baseline and limited by post-op pain, balance, cognitive status, and activity tolerance. Patient requires overall moderate assist of 2 for bed mobility, sit to stand, and steps along edge of bed with rolling walker. Patient unsteady and is a high risk for falls. Anticipate slow progress. Recommend SNF for discharge to maximize functional potential and decrease caregiver burden. Acute PT will continue to follow and progress as able. Current Level of Function Impacting Discharge (mobility/balance): moderate assist x2 sit<>stand and steps along edge of bed with rolling walker    Other factors to consider for discharge: dementia, assist with ADLs at baseline, ambulatory with walker         PLAN :  Patient continues to benefit from skilled intervention to address the above impairments. Continue treatment per established plan of care. to address goals. Recommendation for discharge: (in order for the patient to meet his/her long term goals)  Therapy up to 5 days/week in SNF setting    This discharge recommendation:  Has been made in collaboration with the attending provider and/or case management    IF patient discharges home will need the following DME: to be determined (TBD)       SUBJECTIVE:   Patient stated i'm in the kitchen.     OBJECTIVE DATA SUMMARY:   Critical Behavior:  Neurologic State: Confused  Orientation Level: Oriented to person,Disoriented to place,Disoriented to situation,Disoriented to time  Cognition: Decreased attention/concentration  Safety/Judgement: Decreased awareness of environment,Decreased insight into deficits,Decreased awareness of need for safety  Functional Mobility Training:  Bed Mobility:  Supine to Sit: Moderate assistance;Assist x2  Sit to Supine: Moderate assistance;Assist x2  Scooting: Maximum assistance  Transfers:  Sit to Stand:  Moderate assistance  Stand to Sit: Minimum assistance  Balance:  Sitting: Impaired  Sitting - Static: Fair (occasional)  Sitting - Dynamic: Fair (occasional)  Standing: Impaired  Standing - Static: Constant support; Fair  Standing - Dynamic : Constant support; Fair  Ambulation/Gait Training:  Distance (ft): 2 Feet (ft)  Assistive Device: Walker, rolling  Ambulation - Level of Assistance: Moderate assistance;Minimal assistance;Assist x2  Base of Support: Narrowed  Speed/Corrie: Shuffled    Pain Rating:  Patient did not rate pain but post-op pain limiting all mobility    Activity Tolerance:   Poor    After treatment patient left in no apparent distress:   Supine in bed, Call bell within reach, Bed / chair alarm activated, Caregiver / family present, and Side rails x 3    COMMUNICATION/COLLABORATION:   The patients plan of care was discussed with: Occupational therapist, Registered nurse, and Case management.      Mortimer Dustman, PT   Time Calculation: 30 mins

## 2022-04-08 NOTE — PROGRESS NOTES
RUR:  11% Low     JACKELINE:  SNF; referral sent to [facility] (p: [phone]) via [address]; accepted. Likely BLS transport once medically stable. Follow-up with PCP/specialist.      Primary Contact: DaughterJeffry, [phone]     12:30PM - CM reviewed chart. Per review and ID rounds, patient is to have CT tomorrow, 4/9 to evaluate gastric repair. CM noted therapy discharge disposition recommendations for SNF. CM spoke with patient's daughter, Charmaine Dean who stated she is a nurse at [facility] and that is the only SNF she would like for her mother to discharge to. Referral sent to [facility] via [address]; awaiting response. CM called and left voicemail with [facility] (p: [phone]) to discuss referral, requested return call. 1:30PM - CM received voicemail from [facility] (p: [phone]) stating patient has been accepted, available bed likely Monday, 4/11. CM provided update to patient's daughter via phone. Daughter thanked CM for the update. Discharge pending medical progress and final recommendations. CM to continue to follow as needed.     Regina Ordonez, ROMERO   [phone]

## 2022-04-09 ENCOUNTER — APPOINTMENT (OUTPATIENT)
Dept: NON INVASIVE DIAGNOSTICS | Age: 87
DRG: 326 | End: 2022-04-09
Attending: HOSPITALIST
Payer: MEDICARE

## 2022-04-09 ENCOUNTER — APPOINTMENT (OUTPATIENT)
Dept: CT IMAGING | Age: 87
DRG: 326 | End: 2022-04-09
Attending: SURGERY
Payer: MEDICARE

## 2022-04-09 LAB
ANION GAP SERPL CALC-SCNC: 9 MMOL/L (ref 5–15)
ATRIAL RATE: 85 BPM
BASOPHILS # BLD: 0 K/UL (ref 0–0.1)
BASOPHILS NFR BLD: 0 % (ref 0–1)
BUN SERPL-MCNC: 8 MG/DL (ref 6–20)
BUN/CREAT SERPL: 9 (ref 12–20)
CALCIUM SERPL-MCNC: 8.7 MG/DL (ref 8.5–10.1)
CALCULATED P AXIS, ECG09: 84 DEGREES
CALCULATED R AXIS, ECG10: -58 DEGREES
CALCULATED T AXIS, ECG11: 120 DEGREES
CHLORIDE SERPL-SCNC: 105 MMOL/L (ref 97–108)
CO2 SERPL-SCNC: 23 MMOL/L (ref 21–32)
CREAT SERPL-MCNC: 0.87 MG/DL (ref 0.55–1.02)
DIAGNOSIS, 93000: NORMAL
DIFFERENTIAL METHOD BLD: ABNORMAL
EOSINOPHIL # BLD: 0.1 K/UL (ref 0–0.4)
EOSINOPHIL NFR BLD: 1 % (ref 0–7)
ERYTHROCYTE [DISTWIDTH] IN BLOOD BY AUTOMATED COUNT: 14.1 % (ref 11.5–14.5)
GLUCOSE BLD STRIP.AUTO-MCNC: 69 MG/DL (ref 65–117)
GLUCOSE SERPL-MCNC: 78 MG/DL (ref 65–100)
HCT VFR BLD AUTO: 39.5 % (ref 35–47)
HGB BLD-MCNC: 13.2 G/DL (ref 11.5–16)
IMM GRANULOCYTES # BLD AUTO: 0 K/UL (ref 0–0.04)
IMM GRANULOCYTES NFR BLD AUTO: 0 % (ref 0–0.5)
LYMPHOCYTES # BLD: 0.7 K/UL (ref 0.8–3.5)
LYMPHOCYTES NFR BLD: 8 % (ref 12–49)
MCH RBC QN AUTO: 29.4 PG (ref 26–34)
MCHC RBC AUTO-ENTMCNC: 33.4 G/DL (ref 30–36.5)
MCV RBC AUTO: 88 FL (ref 80–99)
MONOCYTES # BLD: 0.5 K/UL (ref 0–1)
MONOCYTES NFR BLD: 5 % (ref 5–13)
NEUTS SEG # BLD: 8 K/UL (ref 1.8–8)
NEUTS SEG NFR BLD: 86 % (ref 32–75)
NRBC # BLD: 0 K/UL (ref 0–0.01)
NRBC BLD-RTO: 0 PER 100 WBC
P-R INTERVAL, ECG05: 190 MS
PLATELET # BLD AUTO: 185 K/UL (ref 150–400)
PMV BLD AUTO: 9.4 FL (ref 8.9–12.9)
POTASSIUM SERPL-SCNC: 2.9 MMOL/L (ref 3.5–5.1)
Q-T INTERVAL, ECG07: 450 MS
QRS DURATION, ECG06: 158 MS
QTC CALCULATION (BEZET), ECG08: 535 MS
RBC # BLD AUTO: 4.49 M/UL (ref 3.8–5.2)
RBC MORPH BLD: ABNORMAL
SERVICE CMNT-IMP: NORMAL
SODIUM SERPL-SCNC: 137 MMOL/L (ref 136–145)
TROPONIN-HIGH SENSITIVITY: 278 NG/L (ref 0–51)
VENTRICULAR RATE, ECG03: 85 BPM
WBC # BLD AUTO: 9.3 K/UL (ref 3.6–11)

## 2022-04-09 PROCEDURE — 74011250637 HC RX REV CODE- 250/637: Performed by: HOSPITALIST

## 2022-04-09 PROCEDURE — C9113 INJ PANTOPRAZOLE SODIUM, VIA: HCPCS | Performed by: SURGERY

## 2022-04-09 PROCEDURE — 82962 GLUCOSE BLOOD TEST: CPT

## 2022-04-09 PROCEDURE — 36415 COLL VENOUS BLD VENIPUNCTURE: CPT

## 2022-04-09 PROCEDURE — 74011000258 HC RX REV CODE- 258: Performed by: SURGERY

## 2022-04-09 PROCEDURE — 85025 COMPLETE CBC W/AUTO DIFF WBC: CPT

## 2022-04-09 PROCEDURE — 80048 BASIC METABOLIC PNL TOTAL CA: CPT

## 2022-04-09 PROCEDURE — 74011000636 HC RX REV CODE- 636: Performed by: RADIOLOGY

## 2022-04-09 PROCEDURE — 93306 TTE W/DOPPLER COMPLETE: CPT

## 2022-04-09 PROCEDURE — 65660000001 HC RM ICU INTERMED STEPDOWN

## 2022-04-09 PROCEDURE — 74011250636 HC RX REV CODE- 250/636: Performed by: HOSPITALIST

## 2022-04-09 PROCEDURE — 99024 POSTOP FOLLOW-UP VISIT: CPT | Performed by: SURGERY

## 2022-04-09 PROCEDURE — 74011250636 HC RX REV CODE- 250/636: Performed by: SURGERY

## 2022-04-09 PROCEDURE — 74011250637 HC RX REV CODE- 250/637: Performed by: SURGERY

## 2022-04-09 PROCEDURE — 74177 CT ABD & PELVIS W/CONTRAST: CPT

## 2022-04-09 PROCEDURE — 84484 ASSAY OF TROPONIN QUANT: CPT

## 2022-04-09 PROCEDURE — 74011000250 HC RX REV CODE- 250: Performed by: SURGERY

## 2022-04-09 RX ORDER — HYDROCHLOROTHIAZIDE 25 MG/1
25 TABLET ORAL DAILY
Status: DISCONTINUED | OUTPATIENT
Start: 2022-04-10 | End: 2022-04-13

## 2022-04-09 RX ORDER — POTASSIUM CHLORIDE 7.45 MG/ML
10 INJECTION INTRAVENOUS
Status: COMPLETED | OUTPATIENT
Start: 2022-04-09 | End: 2022-04-09

## 2022-04-09 RX ORDER — AMLODIPINE BESYLATE 5 MG/1
10 TABLET ORAL DAILY
Status: DISCONTINUED | OUTPATIENT
Start: 2022-04-09 | End: 2022-04-12

## 2022-04-09 RX ORDER — LABETALOL HYDROCHLORIDE 5 MG/ML
20 INJECTION, SOLUTION INTRAVENOUS
Status: DISCONTINUED | OUTPATIENT
Start: 2022-04-09 | End: 2022-04-14 | Stop reason: HOSPADM

## 2022-04-09 RX ADMIN — POTASSIUM CHLORIDE 10 MEQ: 7.46 INJECTION, SOLUTION INTRAVENOUS at 11:32

## 2022-04-09 RX ADMIN — IOPAMIDOL 100 ML: 755 INJECTION, SOLUTION INTRAVENOUS at 13:12

## 2022-04-09 RX ADMIN — IOHEXOL 50 ML: 240 INJECTION, SOLUTION INTRATHECAL; INTRAVASCULAR; INTRAVENOUS; ORAL at 13:12

## 2022-04-09 RX ADMIN — SODIUM CHLORIDE, PRESERVATIVE FREE 40 MG: 5 INJECTION INTRAVENOUS at 20:54

## 2022-04-09 RX ADMIN — ACETAMINOPHEN 650 MG: 325 TABLET ORAL at 23:17

## 2022-04-09 RX ADMIN — POTASSIUM CHLORIDE 10 MEQ: 7.46 INJECTION, SOLUTION INTRAVENOUS at 12:32

## 2022-04-09 RX ADMIN — AMLODIPINE BESYLATE 10 MG: 5 TABLET ORAL at 11:18

## 2022-04-09 RX ADMIN — SODIUM CHLORIDE, PRESERVATIVE FREE 10 ML: 5 INJECTION INTRAVENOUS at 13:55

## 2022-04-09 RX ADMIN — FLUCONAZOLE 200 MG: 2 INJECTION, SOLUTION INTRAVENOUS at 05:16

## 2022-04-09 RX ADMIN — SODIUM CHLORIDE, PRESERVATIVE FREE 40 MG: 5 INJECTION INTRAVENOUS at 05:10

## 2022-04-09 RX ADMIN — POTASSIUM CHLORIDE 10 MEQ: 7.46 INJECTION, SOLUTION INTRAVENOUS at 09:26

## 2022-04-09 RX ADMIN — PIPERACILLIN SODIUM AND TAZOBACTAM SODIUM 3.38 G: 3; 375 INJECTION, POWDER, FOR SOLUTION INTRAVENOUS at 20:54

## 2022-04-09 RX ADMIN — SODIUM CHLORIDE, PRESERVATIVE FREE 10 ML: 5 INJECTION INTRAVENOUS at 06:00

## 2022-04-09 RX ADMIN — SODIUM CHLORIDE, PRESERVATIVE FREE 10 ML: 5 INJECTION INTRAVENOUS at 21:03

## 2022-04-09 RX ADMIN — PIPERACILLIN SODIUM AND TAZOBACTAM SODIUM 3.38 G: 3; 375 INJECTION, POWDER, FOR SOLUTION INTRAVENOUS at 03:24

## 2022-04-09 RX ADMIN — PIPERACILLIN SODIUM AND TAZOBACTAM SODIUM 3.38 G: 3; 375 INJECTION, POWDER, FOR SOLUTION INTRAVENOUS at 16:19

## 2022-04-09 RX ADMIN — POTASSIUM CHLORIDE 10 MEQ: 7.46 INJECTION, SOLUTION INTRAVENOUS at 10:41

## 2022-04-09 NOTE — PROGRESS NOTES
Problem: Pressure Injury - Risk of  Goal: *Prevention of pressure injury  Description: Document Avinash Scale and appropriate interventions in the flowsheet. Outcome: Progressing Towards Goal  Note: Pressure Injury Interventions:  Sensory Interventions: Avoid rigorous massage over bony prominences,Check visual cues for pain,Float heels,Keep linens dry and wrinkle-free,Minimize linen layers,Monitor skin under medical devices,Pad between skin to skin    Moisture Interventions: Check for incontinence Q2 hours and as needed,Internal/External urinary devices    Activity Interventions: Pressure redistribution bed/mattress(bed type)    Mobility Interventions: PT/OT evaluation    Nutrition Interventions: Document food/fluid/supplement intake                     Problem: Patient Education: Go to Patient Education Activity  Goal: Patient/Family Education  Outcome: Progressing Towards Goal     Problem: Falls - Risk of  Goal: *Absence of Falls  Description: Document Lorenza Fall Risk and appropriate interventions in the flowsheet.   Outcome: Progressing Towards Goal  Note: Fall Risk Interventions:  Mobility Interventions: Bed/chair exit alarm    Mentation Interventions: Door open when patient unattended    Medication Interventions: Bed/chair exit alarm    Elimination Interventions: Bed/chair exit alarm              Problem: Patient Education: Go to Patient Education Activity  Goal: Patient/Family Education  Outcome: Progressing Towards Goal     Problem: Patient Education: Go to Patient Education Activity  Goal: Patient/Family Education  Outcome: Progressing Towards Goal     Problem: Patient Education: Go to Patient Education Activity  Goal: Patient/Family Education  Outcome: Progressing Towards Goal

## 2022-04-09 NOTE — CONSULTS
CARDIOLOGY CONSULT                 Assessment:     Assessment:       Active Problems:    Perforated gastric ulcer ([facility] [address] (4/6/2022)         Plan:    1. CHF :   acute diastolic    Has severe hypertorpy  May be old hypertopic Cardiomyopathy  LAP estimated elevated   btnp 15 K  Diuresis  Will give lasix 40 bid for 1-2 days   2. POD 4 open repair of perforated gastric ulcer  3 Elevated troponin  Trend Flat   This is not consistent with MI   Non sepecific finding             Subjective:    Date of  Admission: 4/5/2022 11:22 PM     Admission type:Emergency    French Torres is a 80 y.o. female admitted for Perforated gastric ulcer ([facility] [address] Rupert Solis. [address] presented wtin abdominal pain and found to have perforated gastric ulcer. Open modified Grahams repair of perforated ulcer repair on 4/6/2022.  Post op day 2  elevated troponin, elevated BNP, concern for flash pulmonary edema,  Denies chest pain or sob at rest     Patient Active Problem List    Diagnosis Date Noted    [facility] ([facility] 04/06/2022      Constantine Pope DO  Past Medical History:   Diagnosis Date    Arthritis     Dementia ([facility]     Depression     Essential hypertension     Headache     High cholesterol     Hypertension     Pacemaker       Past Surgical History:   Procedure Laterality Date    HX GI      EGD/colonoscopy    HX GYN      hysterectomy     Allergies   Allergen Reactions    Bactrim [Sulfamethoprim] Rash      Family History   Problem Relation Age of Onset    Breast Cancer Sister 58    Cancer Brother         lung    Heart Failure Brother     Breast Cancer Sister 66    Heart Failure Sister       Current Facility-Administered Medications   Medication Dose Route Frequency    labetaloL (NORMODYNE;TRANDATE) injection 20 mg  20 mg IntraVENous Q6H PRN    amLODIPine (NORVASC) tablet 10 mg  10 mg Oral DAILY    hydrALAZINE (APRESOLINE) tablet 50 mg  50 mg Oral Q6H PRN    albuterol-ipratropium (DUO-NEB) 2.5 MG-0.5 MG/3 ML  3 mL Nebulization Q4H PRN    simethicone (MYLICON) 71BC/1.9GM oral drops 20 mg  20 mg Oral QID PRN    LORazepam (ATIVAN) injection 1 mg  1 mg IntraVENous Q6H PRN    pantoprazole (PROTONIX) 40 mg in 0.9% sodium chloride 10 mL injection  40 mg IntraVENous Q12H    piperacillin-tazobactam (ZOSYN) 3.375 g in 0.9% sodium chloride (MBP/ADV) 100 mL MBP  3.375 g IntraVENous Q8H    [Held by provider] dextrose 5% - 0.45% NaCl with KCl 20 mEq/L infusion  125 mL/hr IntraVENous CONTINUOUS    sodium chloride (NS) flush 5-40 mL  5-40 mL IntraVENous Q8H    sodium chloride (NS) flush 5-40 mL  5-40 mL IntraVENous PRN    acetaminophen (TYLENOL) tablet 650 mg  650 mg Oral Q6H PRN    Or    acetaminophen (TYLENOL) suppository 650 mg  650 mg Rectal Q6H PRN    polyethylene glycol (MIRALAX) packet 17 g  17 g Oral DAILY PRN    promethazine (PHENERGAN) tablet 12.5 mg  12.5 mg Oral Q6H PRN    Or    ondansetron (ZOFRAN) injection 4 mg  4 mg IntraVENous Q4H PRN    morphine injection 2-4 mg  2-4 mg IntraVENous Q2H PRN    fluconazole (DIFLUCAN) 200mg/100 mL IVPB (premix)  200 mg IntraVENous Q24H      Shx:  No hx of tobacco   No etoh use  FMHX;  Denies hx of heart disease or mi      Review of Symptoms:  A comprehensive review of systems was negative. No hemoptysis, hematemesis, epistaxis, melena, hematuria. No fevers,  Rashes, seizures, visual disturbances, difficulty walking, no abdominal pain         Physical Exam    Visit Vitals  BP (!) 150/71   Pulse 99   Temp 99.4 °F (37.4 °C)   Resp 29   Ht 5' 1\" (1.549 m)   Wt 49.4 kg (109 lb)   SpO2 96%   BMI 20.60 kg/m²     Skin warm and dry  PERRLA, EOMI  Oropharynx without exudate. Mallampati 2  Neck supple, thyroid not enlarged  Lungs clear  PMI non displaced. Normal S1/ S2   No Mummurs, click or Rubs  No S3 or S4  Abdomen soft and non tender, No Hepatosplenomegaly  Pulses 2+ throughout,   Neuro:    normal facial grimace,  Moves all extremities. AAAO  unanxious      Cardiographics    Telemetry: sinus v pace   ECG: sinus v pace  Labs:   Recent Results (from the past 24 hour(s))   TROPONIN-HIGH SENSITIVITY    Collection Time: 04/09/22  2:31 AM   Result Value Ref Range    Troponin-High Sensitivity 278 (HH) 0 - 51 ng/L   METABOLIC PANEL, BASIC    Collection Time: 04/09/22  2:31 AM   Result Value Ref Range    Sodium 137 136 - 145 mmol/L    Potassium 2.9 (L) 3.5 - 5.1 mmol/L    Chloride 105 97 - 108 mmol/L    CO2 23 21 - 32 mmol/L    Anion gap 9 5 - 15 mmol/L    Glucose 78 65 - 100 mg/dL    BUN 8 6 - 20 MG/DL    Creatinine 0.87 0.55 - 1.02 MG/DL    BUN/Creatinine ratio 9 (L) 12 - 20      GFR est AA >60 >60 ml/min/1.73m2    GFR est non-AA >60 >60 ml/min/1.73m2    Calcium 8.7 8.5 - 10.1 MG/DL   CBC WITH AUTOMATED DIFF    Collection Time: 04/09/22  2:31 AM   Result Value Ref Range    WBC 9.3 3.6 - 11.0 K/uL    RBC 4.49 3.80 - 5.20 M/uL    HGB 13.2 11.5 - 16.0 g/dL    HCT 39.5 35.0 - 47.0 %    MCV 88.0 80.0 - 99.0 FL    MCH 29.4 26.0 - 34.0 PG    MCHC 33.4 30.0 - 36.5 g/dL    RDW 14.1 11.5 - 14.5 %    PLATELET 754 670 - 172 K/uL    MPV 9.4 8.9 - 12.9 FL    NRBC 0.0 0  WBC    ABSOLUTE NRBC 0.00 0.00 - 0.01 K/uL    NEUTROPHILS 86 (H) 32 - 75 %    LYMPHOCYTES 8 (L) 12 - 49 %    MONOCYTES 5 5 - 13 %    EOSINOPHILS 1 0 - 7 %    BASOPHILS 0 0 - 1 %    IMMATURE GRANULOCYTES 0 0.0 - 0.5 %    ABS. NEUTROPHILS 8.0 1.8 - 8.0 K/UL    ABS. LYMPHOCYTES 0.7 (L) 0.8 - 3.5 K/UL    ABS. MONOCYTES 0.5 0.0 - 1.0 K/UL    ABS. EOSINOPHILS 0.1 0.0 - 0.4 K/UL    ABS. BASOPHILS 0.0 0.0 - 0.1 K/UL    ABS. IMM.  GRANS. 0.0 0.00 - 0.04 K/UL    DF SMEAR SCANNED      RBC COMMENTS NORMOCYTIC, NORMOCHROMIC     GLUCOSE, POC    Collection Time: 04/09/22  8:31 AM   Result Value Ref Range    Glucose (POC) 69 65 - 117 mg/dL    Performed by Hodan RINCON ADULT COMPLETE    Collection Time: 04/09/22  5:05 PM   Result Value Ref Range    IVSd 1.4 (A) 0.6 - 0.9 cm    LVIDd 3.8 (A) 3.9 - 5.3 cm    LVIDs 2.7 cm    LVOT Diameter 2.0 cm    LVPWd 1.4 (A) 0.6 - 0.9 cm    LVOT Peak Gradient 8 mmHg    LVOT Mean Gradient 4 mmHg    LVOT SV 64.7 ml    LVOT Peak Velocity 1.4 m/s    LVOT VTI 20.6 cm    RVSP 36 mmHg    RV Free Wall Peak S' 13 cm/s    LA Diameter 2.7 cm    LA Volume 4C 38 22 - 52 mL    Est. RA Pressure 3 mmHg    AV Area by Peak Velocity 2.7 cm2    AV Peak Gradient 10 mmHg    AV Peak Velocity 1.6 m/s    MV A Velocity 1.14 m/s    MV E Wave Deceleration Time 116.6 ms    MV E Velocity 0.57 m/s    LV E' Lateral Velocity 3 cm/s    LV E' Septal Velocity 9 cm/s    MV PHT 33.8 ms    MV Area by PHT 6.5 cm2    PV Peak Gradient 2 mmHg    PV Max Velocity 0.8 m/s    TAPSE 1.7 1.7 cm    TR Peak Gradient 33 mmHg    TR Max Velocity 2.89 m/s    Aortic Root 2.8 cm    Fractional Shortening 2D 29 28 - 44 %    LVIDd Index 2.60 cm/m2    LVIDs Index 1.85 cm/m2    LV RWT Ratio 0.74     LV Mass 2D 194.1 (A) 67 - 162 g    LV Mass 2D Index 133.0 (A) 43 - 95 g/m2    MV E/A 0.50     E/E' Ratio (Averaged) 12.67     E/E' Lateral 19.00     E/E' Septal 6.33     LVOT Stroke Volume Index 44.3 mL/m2    LVOT Area 3.1 cm2    LA Volume Index 4C 26 16 - 34 mL/m2    LA Size Index 1.85 cm/m2    LA/AO Root Ratio 0.96     Ao Root Index 1.92 cm/m2    AV Velocity Ratio 0.88     JOI/BSA Peak Velocity 1.8 cm2/m2

## 2022-04-09 NOTE — PROGRESS NOTES
Progress Note    Patient: Yanely Patel MRN: [id number]  SSN: [id number]    YOB: 1934  Age: 80 y.o. Sex: female      Admit Date: 2022    3 Days Post-Op    Procedure:  Procedure(s):  LAPAROSCOPY GENERAL DIAGNOSTIC, perforated ulcer repair    Subjective:     Patient feeling pretty good today. Denies any nausea or vomiting. A small amount of abdominal pain. Objective:     Visit Vitals  BP (!) 150/71 (BP 1 Location: Right upper arm, BP Patient Position: At rest)   Pulse 98   Temp 99.4 °F (37.4 °C)   Resp 29   Ht 5' 1\" (1.549 m)   Wt 49.7 kg (109 lb 9.1 oz)   SpO2 96%   BMI 20.70 kg/m²       Temp (24hrs), Av.7 °F (37.1 °C), Min:98.1 °F (36.7 °C), Max:99.6 °F (37.6 °C)      Physical Exam:    General alert in no acute distress  Lungs clear bilaterally  Heart regular rate and rhythm  Abdomen soft minimal tenderness FILEMON serous    Data Review: images and reports reviewed  CT- Antral wall thickening may represent underlying peptic ulcer disease. Pericholecystic inflammatory change. Mild ascites. Small bilateral pleural  effusions with underlying atelectasis. Lab Review: All lab results for the last 24 hours reviewed.   Recent Results (from the past 24 hour(s))   NT-PRO BNP    Collection Time: 22  4:36 PM   Result Value Ref Range    NT pro-BNP 15,868 (H) <450 PG/ML   TROPONIN-HIGH SENSITIVITY    Collection Time: 22  4:36 PM   Result Value Ref Range    Troponin-High Sensitivity 299 (HH) 0 - 51 ng/L   EKG, 12 LEAD, INITIAL    Collection Time: 22  4:41 PM   Result Value Ref Range    Ventricular Rate 85 BPM    Atrial Rate 85 BPM    P-R Interval 190 ms    QRS Duration 158 ms    Q-T Interval 450 ms    QTC Calculation (Bezet) 535 ms    Calculated P Axis 84 degrees    Calculated R Axis -58 degrees    Calculated T Axis 120 degrees    Diagnosis       atrial-sensed ventricular-paced complexes  Abnormal ECG  When compared with ECG of 2022 23:54,  No significant change was found  Confirmed by Margot Bejarano M.D., James Smith ([id number]) on 4/9/2022 2:37:14 PM     BLOOD GAS, ARTERIAL    Collection Time: 04/08/22  5:25 PM   Result Value Ref Range    pH 7.52 (H) 7.35 - 7.45      PCO2 26 (L) 35 - 45 mmHg    PO2 68 (L) 80 - 100 mmHg    O2 SAT 96 92 - 97 %    BICARBONATE 21 (L) 22 - 26 mmol/L    BASE DEFICIT 0.7 mmol/L    O2 METHOD ROOM AIR      Sample source ARTERIAL      SITE LEFT BRACHIAL      REBECCA'S TEST NOT APPLICABLE     LACTIC ACID    Collection Time: 04/08/22  6:25 PM   Result Value Ref Range    Lactic acid 1.9 0.4 - 2.0 MMOL/L   PROCALCITONIN    Collection Time: 04/08/22  6:25 PM   Result Value Ref Range    Procalcitonin 2.45 ng/mL   SAMPLES BEING HELD    Collection Time: 04/08/22  6:25 PM   Result Value Ref Range    SAMPLES BEING HELD 1SST, 1PST     COMMENT        Add-on orders for these samples will be processed based on acceptable specimen integrity and analyte stability, which may vary by analyte.    TROPONIN-HIGH SENSITIVITY    Collection Time: 04/09/22  2:31 AM   Result Value Ref Range    Troponin-High Sensitivity 278 (HH) 0 - 51 ng/L   METABOLIC PANEL, BASIC    Collection Time: 04/09/22  2:31 AM   Result Value Ref Range    Sodium 137 136 - 145 mmol/L    Potassium 2.9 (L) 3.5 - 5.1 mmol/L    Chloride 105 97 - 108 mmol/L    CO2 23 21 - 32 mmol/L    Anion gap 9 5 - 15 mmol/L    Glucose 78 65 - 100 mg/dL    BUN 8 6 - 20 MG/DL    Creatinine 0.87 0.55 - 1.02 MG/DL    BUN/Creatinine ratio 9 (L) 12 - 20      GFR est AA >60 >60 ml/min/1.73m2    GFR est non-AA >60 >60 ml/min/1.73m2    Calcium 8.7 8.5 - 10.1 MG/DL   CBC WITH AUTOMATED DIFF    Collection Time: 04/09/22  2:31 AM   Result Value Ref Range    WBC 9.3 3.6 - 11.0 K/uL    RBC 4.49 3.80 - 5.20 M/uL    HGB 13.2 11.5 - 16.0 g/dL    HCT 39.5 35.0 - 47.0 %    MCV 88.0 80.0 - 99.0 FL    MCH 29.4 26.0 - 34.0 PG    MCHC 33.4 30.0 - 36.5 g/dL    RDW 14.1 11.5 - 14.5 %    PLATELET 370 612 - 744 K/uL    MPV 9.4 8.9 - 12.9 FL    NRBC 0.0 0  WBC ABSOLUTE NRBC 0.00 0.00 - 0.01 K/uL    NEUTROPHILS 86 (H) 32 - 75 %    LYMPHOCYTES 8 (L) 12 - 49 %    MONOCYTES 5 5 - 13 %    EOSINOPHILS 1 0 - 7 %    BASOPHILS 0 0 - 1 %    IMMATURE GRANULOCYTES 0 0.0 - 0.5 %    ABS. NEUTROPHILS 8.0 1.8 - 8.0 K/UL    ABS. LYMPHOCYTES 0.7 (L) 0.8 - 3.5 K/UL    ABS. MONOCYTES 0.5 0.0 - 1.0 K/UL    ABS. EOSINOPHILS 0.1 0.0 - 0.4 K/UL    ABS. BASOPHILS 0.0 0.0 - 0.1 K/UL    ABS. IMM. GRANS. 0.0 0.00 - 0.04 K/UL    DF SMEAR SCANNED      RBC COMMENTS NORMOCYTIC, NORMOCHROMIC     GLUCOSE, POC    Collection Time: 04/09/22  8:31 AM   Result Value Ref Range    Glucose (POC) 69 65 - 117 mg/dL    Performed by Ira ALFONSO        Assessment:     Hospital Problems  Date Reviewed: 12/12/2016          Codes Class Noted POA    Perforated gastric ulcer ([facility] [address] ICD-10-CM: K25.5  ICD-9-CM: 531.50  4/6/2022 Unknown              Plan/Recommendations/Medical Decision Making:   Status post ulcer repair from an ulcer standpoint she seems to be doing well. CT scan does not show a leak. Will advance diet to full liquids. Had flash pulmonary edema yesterday and an NSTEMI. Apparently cardiology was called but it was to the wrong group. Will reconsult cardiology. Appreciate hospitalist input. She does have hypokalemia which has been repleted already.

## 2022-04-10 LAB
ANION GAP SERPL CALC-SCNC: 12 MMOL/L (ref 5–15)
BASOPHILS # BLD: 0 K/UL (ref 0–0.1)
BASOPHILS NFR BLD: 0 % (ref 0–1)
BUN SERPL-MCNC: 11 MG/DL (ref 6–20)
BUN/CREAT SERPL: 15 (ref 12–20)
CALCIUM SERPL-MCNC: 8.1 MG/DL (ref 8.5–10.1)
CHLORIDE SERPL-SCNC: 111 MMOL/L (ref 97–108)
CO2 SERPL-SCNC: 17 MMOL/L (ref 21–32)
CREAT SERPL-MCNC: 0.72 MG/DL (ref 0.55–1.02)
DIFFERENTIAL METHOD BLD: ABNORMAL
ECHO AO ROOT DIAM: 2.8 CM
ECHO AO ROOT INDEX: 1.92 CM/M2
ECHO AV AREA PEAK VELOCITY: 2.7 CM2
ECHO AV AREA/BSA PEAK VELOCITY: 1.8 CM2/M2
ECHO AV PEAK GRADIENT: 10 MMHG
ECHO AV PEAK VELOCITY: 1.6 M/S
ECHO AV VELOCITY RATIO: 0.88
ECHO EST RA PRESSURE: 3 MMHG
ECHO LA DIAMETER INDEX: 1.85 CM/M2
ECHO LA DIAMETER: 2.7 CM
ECHO LA TO AORTIC ROOT RATIO: 0.96
ECHO LA VOL 4C: 38 ML (ref 22–52)
ECHO LA VOLUME INDEX A4C: 26 ML/M2 (ref 16–34)
ECHO LV E' LATERAL VELOCITY: 3 CM/S
ECHO LV E' SEPTAL VELOCITY: 9 CM/S
ECHO LV FRACTIONAL SHORTENING: 29 % (ref 28–44)
ECHO LV INTERNAL DIMENSION DIASTOLE INDEX: 2.6 CM/M2
ECHO LV INTERNAL DIMENSION DIASTOLIC: 3.8 CM (ref 3.9–5.3)
ECHO LV INTERNAL DIMENSION SYSTOLIC INDEX: 1.85 CM/M2
ECHO LV INTERNAL DIMENSION SYSTOLIC: 2.7 CM
ECHO LV IVSD: 1.4 CM (ref 0.6–0.9)
ECHO LV MASS 2D: 194.1 G (ref 67–162)
ECHO LV MASS INDEX 2D: 133 G/M2 (ref 43–95)
ECHO LV POSTERIOR WALL DIASTOLIC: 1.4 CM (ref 0.6–0.9)
ECHO LV RELATIVE WALL THICKNESS RATIO: 0.74
ECHO LVOT AREA: 3.1 CM2
ECHO LVOT DIAM: 2 CM
ECHO LVOT MEAN GRADIENT: 4 MMHG
ECHO LVOT PEAK GRADIENT: 8 MMHG
ECHO LVOT PEAK VELOCITY: 1.4 M/S
ECHO LVOT STROKE VOLUME INDEX: 44.3 ML/M2
ECHO LVOT SV: 64.7 ML
ECHO LVOT VTI: 20.6 CM
ECHO MV A VELOCITY: 1.14 M/S
ECHO MV AREA PHT: 6.5 CM2
ECHO MV E DECELERATION TIME (DT): 116.6 MS
ECHO MV E VELOCITY: 0.57 M/S
ECHO MV E/A RATIO: 0.5
ECHO MV E/E' LATERAL: 19
ECHO MV E/E' RATIO (AVERAGED): 12.67
ECHO MV E/E' SEPTAL: 6.33
ECHO MV PRESSURE HALF TIME (PHT): 33.8 MS
ECHO PV MAX VELOCITY: 0.8 M/S
ECHO PV PEAK GRADIENT: 2 MMHG
ECHO RIGHT VENTRICULAR SYSTOLIC PRESSURE (RVSP): 36 MMHG
ECHO RV FREE WALL PEAK S': 13 CM/S
ECHO RV TAPSE: 1.7 CM (ref 1.7–?)
ECHO TV REGURGITANT MAX VELOCITY: 2.89 M/S
ECHO TV REGURGITANT PEAK GRADIENT: 33 MMHG
EOSINOPHIL # BLD: 0.1 K/UL (ref 0–0.4)
EOSINOPHIL NFR BLD: 1 % (ref 0–7)
ERYTHROCYTE [DISTWIDTH] IN BLOOD BY AUTOMATED COUNT: 14 % (ref 11.5–14.5)
GLUCOSE SERPL-MCNC: 63 MG/DL (ref 65–100)
HCT VFR BLD AUTO: 32 % (ref 35–47)
HGB BLD-MCNC: 10.7 G/DL (ref 11.5–16)
IMM GRANULOCYTES # BLD AUTO: 0.1 K/UL (ref 0–0.04)
IMM GRANULOCYTES NFR BLD AUTO: 1 % (ref 0–0.5)
LYMPHOCYTES # BLD: 0.8 K/UL (ref 0.8–3.5)
LYMPHOCYTES NFR BLD: 11 % (ref 12–49)
MAGNESIUM SERPL-MCNC: 1.8 MG/DL (ref 1.6–2.4)
MCH RBC QN AUTO: 29.2 PG (ref 26–34)
MCHC RBC AUTO-ENTMCNC: 33.4 G/DL (ref 30–36.5)
MCV RBC AUTO: 87.4 FL (ref 80–99)
MONOCYTES # BLD: 0.8 K/UL (ref 0–1)
MONOCYTES NFR BLD: 10 % (ref 5–13)
NEUTS SEG # BLD: 6.2 K/UL (ref 1.8–8)
NEUTS SEG NFR BLD: 77 % (ref 32–75)
NRBC # BLD: 0 K/UL (ref 0–0.01)
NRBC BLD-RTO: 0 PER 100 WBC
PLATELET # BLD AUTO: 193 K/UL (ref 150–400)
PMV BLD AUTO: 9.8 FL (ref 8.9–12.9)
POTASSIUM SERPL-SCNC: 2.8 MMOL/L (ref 3.5–5.1)
RBC # BLD AUTO: 3.66 M/UL (ref 3.8–5.2)
SODIUM SERPL-SCNC: 140 MMOL/L (ref 136–145)
WBC # BLD AUTO: 8 K/UL (ref 3.6–11)

## 2022-04-10 PROCEDURE — 83735 ASSAY OF MAGNESIUM: CPT

## 2022-04-10 PROCEDURE — 36415 COLL VENOUS BLD VENIPUNCTURE: CPT

## 2022-04-10 PROCEDURE — 99024 POSTOP FOLLOW-UP VISIT: CPT | Performed by: SURGERY

## 2022-04-10 PROCEDURE — 74011250636 HC RX REV CODE- 250/636: Performed by: SURGERY

## 2022-04-10 PROCEDURE — 74011250637 HC RX REV CODE- 250/637: Performed by: HOSPITALIST

## 2022-04-10 PROCEDURE — 74011000250 HC RX REV CODE- 250: Performed by: HOSPITALIST

## 2022-04-10 PROCEDURE — C9113 INJ PANTOPRAZOLE SODIUM, VIA: HCPCS | Performed by: SURGERY

## 2022-04-10 PROCEDURE — 74011250637 HC RX REV CODE- 250/637: Performed by: INTERNAL MEDICINE

## 2022-04-10 PROCEDURE — 74011000258 HC RX REV CODE- 258: Performed by: SURGERY

## 2022-04-10 PROCEDURE — 85025 COMPLETE CBC W/AUTO DIFF WBC: CPT

## 2022-04-10 PROCEDURE — 74011000250 HC RX REV CODE- 250: Performed by: SURGERY

## 2022-04-10 PROCEDURE — 65660000001 HC RM ICU INTERMED STEPDOWN

## 2022-04-10 PROCEDURE — 80048 BASIC METABOLIC PNL TOTAL CA: CPT

## 2022-04-10 PROCEDURE — 74011250636 HC RX REV CODE- 250/636: Performed by: INTERNAL MEDICINE

## 2022-04-10 PROCEDURE — 74011250637 HC RX REV CODE- 250/637: Performed by: SURGERY

## 2022-04-10 PROCEDURE — 74011250636 HC RX REV CODE- 250/636: Performed by: HOSPITALIST

## 2022-04-10 RX ORDER — POTASSIUM CHLORIDE 7.45 MG/ML
10 INJECTION INTRAVENOUS
Status: COMPLETED | OUTPATIENT
Start: 2022-04-10 | End: 2022-04-10

## 2022-04-10 RX ORDER — FUROSEMIDE 10 MG/ML
40 INJECTION INTRAMUSCULAR; INTRAVENOUS 2 TIMES DAILY
Status: COMPLETED | OUTPATIENT
Start: 2022-04-10 | End: 2022-04-11

## 2022-04-10 RX ADMIN — FLUCONAZOLE 200 MG: 2 INJECTION, SOLUTION INTRAVENOUS at 12:05

## 2022-04-10 RX ADMIN — FUROSEMIDE 40 MG: 10 INJECTION, SOLUTION INTRAVENOUS at 11:16

## 2022-04-10 RX ADMIN — SODIUM CHLORIDE, PRESERVATIVE FREE 10 ML: 5 INJECTION INTRAVENOUS at 08:30

## 2022-04-10 RX ADMIN — MORPHINE SULFATE 2 MG: 2 INJECTION, SOLUTION INTRAMUSCULAR; INTRAVENOUS at 06:26

## 2022-04-10 RX ADMIN — SODIUM CHLORIDE, PRESERVATIVE FREE 40 MG: 5 INJECTION INTRAVENOUS at 19:04

## 2022-04-10 RX ADMIN — PIPERACILLIN SODIUM AND TAZOBACTAM SODIUM 3.38 G: 3; 375 INJECTION, POWDER, FOR SOLUTION INTRAVENOUS at 11:01

## 2022-04-10 RX ADMIN — SODIUM CHLORIDE, PRESERVATIVE FREE 40 MG: 5 INJECTION INTRAVENOUS at 06:21

## 2022-04-10 RX ADMIN — AMLODIPINE BESYLATE 10 MG: 5 TABLET ORAL at 08:23

## 2022-04-10 RX ADMIN — POTASSIUM CHLORIDE 10 MEQ: 7.46 INJECTION, SOLUTION INTRAVENOUS at 09:35

## 2022-04-10 RX ADMIN — PIPERACILLIN SODIUM AND TAZOBACTAM SODIUM 3.38 G: 3; 375 INJECTION, POWDER, FOR SOLUTION INTRAVENOUS at 19:04

## 2022-04-10 RX ADMIN — LABETALOL HYDROCHLORIDE 20 MG: 5 INJECTION INTRAVENOUS at 06:26

## 2022-04-10 RX ADMIN — POTASSIUM CHLORIDE 10 MEQ: 7.46 INJECTION, SOLUTION INTRAVENOUS at 10:52

## 2022-04-10 RX ADMIN — PIPERACILLIN SODIUM AND TAZOBACTAM SODIUM 3.38 G: 3; 375 INJECTION, POWDER, FOR SOLUTION INTRAVENOUS at 03:00

## 2022-04-10 RX ADMIN — SODIUM CHLORIDE, PRESERVATIVE FREE 10 ML: 5 INJECTION INTRAVENOUS at 14:20

## 2022-04-10 RX ADMIN — LORAZEPAM 1 MG: 2 INJECTION INTRAMUSCULAR; INTRAVENOUS at 22:24

## 2022-04-10 RX ADMIN — ACETAMINOPHEN 650 MG: 325 TABLET ORAL at 19:24

## 2022-04-10 RX ADMIN — POTASSIUM CHLORIDE 10 MEQ: 7.46 INJECTION, SOLUTION INTRAVENOUS at 12:02

## 2022-04-10 RX ADMIN — HYDROCHLOROTHIAZIDE 25 MG: 25 TABLET ORAL at 08:22

## 2022-04-10 RX ADMIN — POTASSIUM CHLORIDE 10 MEQ: 7.46 INJECTION, SOLUTION INTRAVENOUS at 08:22

## 2022-04-10 RX ADMIN — FUROSEMIDE 40 MG: 10 INJECTION, SOLUTION INTRAVENOUS at 18:57

## 2022-04-10 NOTE — PROGRESS NOTES
The patient  does not have a history of falls. I did complete a risk assessment. Internal bed alarm was engaged, but did not function properly when pt exited the bed.

## 2022-04-10 NOTE — PROGRESS NOTES
Hospitalist Progress Note      Hospital summary: 80 y.o lady w/ dementia, HTN, history of pacemaker placement, who is here for perforated gastric ulcer s/p open surgical repair. Assessment/Plan:  HTN: remains hypertensive but overall improving  -continue amlodipine. HCTZ added by cardiology - need to watch serum K    Flash pulmonary edema: due to uncontrolled HTN? S/p diuresis  -IV fluids held  -echocardiogram    Hypokalemia:   -repletion ordered  -monitor    Elevated serum troponin: downtrending, likely related to HTN/pulm edema but will check echo. Cardiology consulted, f/u assessment. Perforated gastric ulcer s/p open repair: per surgery    ----------------------------------------------    CC: f/u HTN    S: no acute complaints, feels better, denies pain, dyspnea, n/v/d     Review of Systems:  Pertinent items are noted in HPI.     O:  Visit Vitals  BP (!) 155/67   Pulse 79   Temp 97.6 °F (36.4 °C)   Resp 18   Ht 5' 1\" (1.549 m)   Wt 49.4 kg (109 lb)   SpO2 99%   BMI 20.60 kg/m²       PHYSICAL EXAM:  Gen: NAD, non-toxic  HEENT: anicteric sclerae, normal conjunctiva  Neck: supple, trachea midline, no adenopathy  Heart: RRR, no MRG, no JVD, no peripheral edema  Lungs: CTA b/l anteriorly, non-labored respirations on O2  Abd: soft, surgical scars dressed, ND, BS+  Extr: warm  Skin: dry, no rash  Neuro: CN II-XII grossly intact, normal speech, moves all extremities  Psych: normal mood, appropriate affect      Intake/Output Summary (Last 24 hours) at 4/10/2022 0957  Last data filed at 4/9/2022 2054  Gross per 24 hour   Intake 1100 ml   Output    Net 1100 ml        Recent labs & imaging reviewed:  Recent Results (from the past 24 hour(s))   ECHO ADULT COMPLETE    Collection Time: 04/09/22  5:05 PM   Result Value Ref Range    IVSd 1.4 (A) 0.6 - 0.9 cm    LVIDd 3.8 (A) 3.9 - 5.3 cm    LVIDs 2.7 cm    LVOT Diameter 2.0 cm    LVPWd 1.4 (A) 0.6 - 0.9 cm    LVOT Peak Gradient 8 mmHg LVOT Mean Gradient 4 mmHg    LVOT SV 64.7 ml    LVOT Peak Velocity 1.4 m/s    LVOT VTI 20.6 cm    RVSP 36 mmHg    RV Free Wall Peak S' 13 cm/s    LA Diameter 2.7 cm    LA Volume 4C 38 22 - 52 mL    Est. RA Pressure 3 mmHg    AV Area by Peak Velocity 2.7 cm2    AV Peak Gradient 10 mmHg    AV Peak Velocity 1.6 m/s    MV A Velocity 1.14 m/s    MV E Wave Deceleration Time 116.6 ms    MV E Velocity 0.57 m/s    LV E' Lateral Velocity 3 cm/s    LV E' Septal Velocity 9 cm/s    MV PHT 33.8 ms    MV Area by PHT 6.5 cm2    PV Peak Gradient 2 mmHg    PV Max Velocity 0.8 m/s    TAPSE 1.7 1.7 cm    TR Peak Gradient 33 mmHg    TR Max Velocity 2.89 m/s    Aortic Root 2.8 cm    Fractional Shortening 2D 29 28 - 44 %    LVIDd Index 2.60 cm/m2    LVIDs Index 1.85 cm/m2    LV RWT Ratio 0.74     LV Mass 2D 194.1 (A) 67 - 162 g    LV Mass 2D Index 133.0 (A) 43 - 95 g/m2    MV E/A 0.50     E/E' Ratio (Averaged) 12.67     E/E' Lateral 19.00     E/E' Septal 6.33     LVOT Stroke Volume Index 44.3 mL/m2    LVOT Area 3.1 cm2    LA Volume Index 4C 26 16 - 34 mL/m2    LA Size Index 1.85 cm/m2    LA/AO Root Ratio 0.96     Ao Root Index 1.92 cm/m2    AV Velocity Ratio 0.88     JOI/BSA Peak Velocity 1.8 cm2/m2   MAGNESIUM    Collection Time: 04/10/22  2:53 AM   Result Value Ref Range    Magnesium 1.8 1.6 - 2.4 mg/dL   CBC WITH AUTOMATED DIFF    Collection Time: 04/10/22  2:53 AM   Result Value Ref Range    WBC 8.0 3.6 - 11.0 K/uL    RBC 3.66 (L) 3.80 - 5.20 M/uL    HGB 10.7 (L) 11.5 - 16.0 g/dL    HCT 32.0 (L) 35.0 - 47.0 %    MCV 87.4 80.0 - 99.0 FL    MCH 29.2 26.0 - 34.0 PG    MCHC 33.4 30.0 - 36.5 g/dL    RDW 14.0 11.5 - 14.5 %    PLATELET 433 550 - 898 K/uL    MPV 9.8 8.9 - 12.9 FL    NRBC 0.0 0  WBC    ABSOLUTE NRBC 0.00 0.00 - 0.01 K/uL    NEUTROPHILS 77 (H) 32 - 75 %    LYMPHOCYTES 11 (L) 12 - 49 %    MONOCYTES 10 5 - 13 %    EOSINOPHILS 1 0 - 7 %    BASOPHILS 0 0 - 1 %    IMMATURE GRANULOCYTES 1 (H) 0.0 - 0.5 %    ABS.  NEUTROPHILS 6. 2 1.8 - 8.0 K/UL    ABS. LYMPHOCYTES 0.8 0.8 - 3.5 K/UL    ABS. MONOCYTES 0.8 0.0 - 1.0 K/UL    ABS. EOSINOPHILS 0.1 0.0 - 0.4 K/UL    ABS. BASOPHILS 0.0 0.0 - 0.1 K/UL    ABS. IMM. GRANS. 0.1 (H) 0.00 - 0.04 K/UL    DF AUTOMATED     METABOLIC PANEL, BASIC    Collection Time: 04/10/22  2:53 AM   Result Value Ref Range    Sodium 140 136 - 145 mmol/L    Potassium 2.8 (L) 3.5 - 5.1 mmol/L    Chloride 111 (H) 97 - 108 mmol/L    CO2 17 (L) 21 - 32 mmol/L    Anion gap 12 5 - 15 mmol/L    Glucose 63 (L) 65 - 100 mg/dL    BUN 11 6 - 20 MG/DL    Creatinine 0.72 0.55 - 1.02 MG/DL    BUN/Creatinine ratio 15 12 - 20      GFR est AA >60 >60 ml/min/1.73m2    GFR est non-AA >60 >60 ml/min/1.73m2    Calcium 8.1 (L) 8.5 - 10.1 MG/DL     Recent Labs     04/10/22  0253 04/09/22  0231   WBC 8.0 9.3   HGB 10.7* 13.2   HCT 32.0* 39.5    185     Recent Labs     04/10/22  0253 04/09/22  0231 04/08/22  0455    137 137   K 2.8* 2.9* 3.9   * 105 113*   CO2 17* 23 19*   BUN 11 8 8   CREA 0.72 0.87 0.70   GLU 63* 78 103*   CA 8.1* 8.7 8.3*   MG 1.8  --   --      No results for input(s): ALT, AP, TBIL, TBILI, TP, ALB, GLOB, GGT, AML, LPSE in the last 72 hours. No lab exists for component: SGOT, GPT, AMYP, HLPSE  No results for input(s): INR, PTP, APTT, INREXT, INREXT in the last 72 hours. No results for input(s): FE, TIBC, PSAT, FERR in the last 72 hours. No results found for: FOL, RBCF   Recent Labs     04/08/22  1725   PH 7.52*   PCO2 26*   PO2 68*     No results for input(s): CPK, CKNDX, TROIQ in the last 72 hours.     No lab exists for component: CPKMB  No results found for: CHOL, CHOLX, CHLST, CHOLV, HDL, HDLP, LDL, LDLC, DLDLP, TGLX, TRIGL, TRIGP, CHHD, CHHDX  Lab Results   Component Value Date/Time    Glucose (POC) 69 04/09/2022 08:31 AM    Glucose (POC) 134 (H) 04/06/2022 09:14 PM     Lab Results   Component Value Date/Time    Color YELLOW/STRAW 04/05/2022 11:40 PM    Appearance CLEAR 04/05/2022 11:40 PM Specific gravity >1.030 (H) 04/05/2022 11:40 PM    pH (UA) 5.5 04/05/2022 11:40 PM    Protein TRACE (A) 04/05/2022 11:40 PM    Glucose Negative 04/05/2022 11:40 PM    Ketone 15 (A) 04/05/2022 11:40 PM    Bilirubin Negative 04/05/2022 11:40 PM    Urobilinogen 0.2 04/05/2022 11:40 PM    Nitrites Negative 04/05/2022 11:40 PM    Leukocyte Esterase Negative 04/05/2022 11:40 PM    Epithelial cells FEW 04/05/2022 11:40 PM    Bacteria Negative 04/05/2022 11:40 PM    WBC 0-4 04/05/2022 11:40 PM    RBC 0-5 04/05/2022 11:40 PM       Med list reviewed  Current Facility-Administered Medications   Medication Dose Route Frequency    potassium chloride 10 mEq in 100 ml IVPB  10 mEq IntraVENous Q1H    labetaloL (NORMODYNE;TRANDATE) injection 20 mg  20 mg IntraVENous Q6H PRN    amLODIPine (NORVASC) tablet 10 mg  10 mg Oral DAILY    hydroCHLOROthiazide (HYDRODIURIL) tablet 25 mg  25 mg Oral DAILY    hydrALAZINE (APRESOLINE) tablet 50 mg  50 mg Oral Q6H PRN    albuterol-ipratropium (DUO-NEB) 2.5 MG-0.5 MG/3 ML  3 mL Nebulization Q4H PRN    simethicone (MYLICON) 51GB/5.6IX oral drops 20 mg  20 mg Oral QID PRN    LORazepam (ATIVAN) injection 1 mg  1 mg IntraVENous Q6H PRN    pantoprazole (PROTONIX) 40 mg in 0.9% sodium chloride 10 mL injection  40 mg IntraVENous Q12H    piperacillin-tazobactam (ZOSYN) 3.375 g in 0.9% sodium chloride (MBP/ADV) 100 mL MBP  3.375 g IntraVENous Q8H    [Held by provider] dextrose 5% - 0.45% NaCl with KCl 20 mEq/L infusion  125 mL/hr IntraVENous CONTINUOUS    sodium chloride (NS) flush 5-40 mL  5-40 mL IntraVENous Q8H    sodium chloride (NS) flush 5-40 mL  5-40 mL IntraVENous PRN    acetaminophen (TYLENOL) tablet 650 mg  650 mg Oral Q6H PRN    Or    acetaminophen (TYLENOL) suppository 650 mg  650 mg Rectal Q6H PRN    polyethylene glycol (MIRALAX) packet 17 g  17 g Oral DAILY PRN    promethazine (PHENERGAN) tablet 12.5 mg  12.5 mg Oral Q6H PRN    Or    ondansetron (ZOFRAN) injection 4 mg  4 mg IntraVENous Q4H PRN    morphine injection 2-4 mg  2-4 mg IntraVENous Q2H PRN    fluconazole (DIFLUCAN) 200mg/100 mL IVPB (premix)  200 mg IntraVENous Q24H       Care Plan discussed with:  Patient/Family and Nurse    Deniz Leiva MD  Internal Medicine  Date of Service: 4/10/2022

## 2022-04-10 NOTE — PROGRESS NOTES
Progress Note    Patient: Khadijah Barbosa MRN: [id number]  SSN: [id number]    YOB: 1934  Age: 80 y.o. Sex: female      Admit Date: 2022    4 Days Post-Op    Procedure:  Procedure(s):  LAPAROSCOPY GENERAL DIAGNOSTIC, perforated ulcer repair    Subjective:     Patient without complaints. Feeling pretty good today. Objective:     Visit Vitals  BP (!) 130/92 (BP 1 Location: Right upper arm, BP Patient Position: At rest)   Pulse (!) 103   Temp 98.1 °F (36.7 °C)   Resp 22   Ht 5' 1\" (1.549 m)   Wt 49.4 kg (109 lb)   SpO2 94%   BMI 20.60 kg/m²       Temp (24hrs), Av.3 °F (36.8 °C), Min:97.5 °F (36.4 °C), Max:99.4 °F (37.4 °C)      Physical Exam:    General alert in no acute distress  Lungs clear bilaterally  Heart regular rate and rhythm  Abdomen soft nontender nondistended  Incisions intact    Data Review: images and reports reviewed    Lab Review: All lab results for the last 24 hours reviewed.   Recent Results (from the past 24 hour(s))   ECHO ADULT COMPLETE    Collection Time: 22  5:05 PM   Result Value Ref Range    IVSd 1.4 (A) 0.6 - 0.9 cm    LVIDd 3.8 (A) 3.9 - 5.3 cm    LVIDs 2.7 cm    LVOT Diameter 2.0 cm    LVPWd 1.4 (A) 0.6 - 0.9 cm    LVOT Peak Gradient 8 mmHg    LVOT Mean Gradient 4 mmHg    LVOT SV 64.7 ml    LVOT Peak Velocity 1.4 m/s    LVOT VTI 20.6 cm    RVSP 36 mmHg    RV Free Wall Peak S' 13 cm/s    LA Diameter 2.7 cm    LA Volume 4C 38 22 - 52 mL    Est. RA Pressure 3 mmHg    AV Area by Peak Velocity 2.7 cm2    AV Peak Gradient 10 mmHg    AV Peak Velocity 1.6 m/s    MV A Velocity 1.14 m/s    MV E Wave Deceleration Time 116.6 ms    MV E Velocity 0.57 m/s    LV E' Lateral Velocity 3 cm/s    LV E' Septal Velocity 9 cm/s    MV PHT 33.8 ms    MV Area by PHT 6.5 cm2    PV Peak Gradient 2 mmHg    PV Max Velocity 0.8 m/s    TAPSE 1.7 1.7 cm    TR Peak Gradient 33 mmHg    TR Max Velocity 2.89 m/s    Aortic Root 2.8 cm    Fractional Shortening 2D 29 28 - 44 %    LVIDd Index 2.60 cm/m2    LVIDs Index 1.85 cm/m2    LV RWT Ratio 0.74     LV Mass 2D 194.1 (A) 67 - 162 g    LV Mass 2D Index 133.0 (A) 43 - 95 g/m2    MV E/A 0.50     E/E' Ratio (Averaged) 12.67     E/E' Lateral 19.00     E/E' Septal 6.33     LVOT Stroke Volume Index 44.3 mL/m2    LVOT Area 3.1 cm2    LA Volume Index 4C 26 16 - 34 mL/m2    LA Size Index 1.85 cm/m2    LA/AO Root Ratio 0.96     Ao Root Index 1.92 cm/m2    AV Velocity Ratio 0.88     JOI/BSA Peak Velocity 1.8 cm2/m2   MAGNESIUM    Collection Time: 04/10/22  2:53 AM   Result Value Ref Range    Magnesium 1.8 1.6 - 2.4 mg/dL   CBC WITH AUTOMATED DIFF    Collection Time: 04/10/22  2:53 AM   Result Value Ref Range    WBC 8.0 3.6 - 11.0 K/uL    RBC 3.66 (L) 3.80 - 5.20 M/uL    HGB 10.7 (L) 11.5 - 16.0 g/dL    HCT 32.0 (L) 35.0 - 47.0 %    MCV 87.4 80.0 - 99.0 FL    MCH 29.2 26.0 - 34.0 PG    MCHC 33.4 30.0 - 36.5 g/dL    RDW 14.0 11.5 - 14.5 %    PLATELET 897 913 - 053 K/uL    MPV 9.8 8.9 - 12.9 FL    NRBC 0.0 0  WBC    ABSOLUTE NRBC 0.00 0.00 - 0.01 K/uL    NEUTROPHILS 77 (H) 32 - 75 %    LYMPHOCYTES 11 (L) 12 - 49 %    MONOCYTES 10 5 - 13 %    EOSINOPHILS 1 0 - 7 %    BASOPHILS 0 0 - 1 %    IMMATURE GRANULOCYTES 1 (H) 0.0 - 0.5 %    ABS. NEUTROPHILS 6.2 1.8 - 8.0 K/UL    ABS. LYMPHOCYTES 0.8 0.8 - 3.5 K/UL    ABS. MONOCYTES 0.8 0.0 - 1.0 K/UL    ABS. EOSINOPHILS 0.1 0.0 - 0.4 K/UL    ABS. BASOPHILS 0.0 0.0 - 0.1 K/UL    ABS. IMM.  GRANS. 0.1 (H) 0.00 - 0.04 K/UL    DF AUTOMATED     METABOLIC PANEL, BASIC    Collection Time: 04/10/22  2:53 AM   Result Value Ref Range    Sodium 140 136 - 145 mmol/L    Potassium 2.8 (L) 3.5 - 5.1 mmol/L    Chloride 111 (H) 97 - 108 mmol/L    CO2 17 (L) 21 - 32 mmol/L    Anion gap 12 5 - 15 mmol/L    Glucose 63 (L) 65 - 100 mg/dL    BUN 11 6 - 20 MG/DL    Creatinine 0.72 0.55 - 1.02 MG/DL    BUN/Creatinine ratio 15 12 - 20      GFR est AA >60 >60 ml/min/1.73m2    GFR est non-AA >60 >60 ml/min/1.73m2    Calcium 8.1 (L) 8.5 - 10.1 MG/DL Assessment:     Hospital Problems  Date Reviewed: 12/12/2016          Codes Class Noted POA    Perforated gastric ulcer ([facility] 75.) ICD-10-CM: K25.5  ICD-9-CM: 531.50  4/6/2022 Unknown              Plan/Recommendations/Medical Decision Making:   Overall seems to be doing a little bit better.   Appreciate hospitalist and cardiology input-   Diuresis and BP control  Continue PPI  Continue antibiotics

## 2022-04-10 NOTE — PROGRESS NOTES
2000: Bedside and Verbal shift change report given to Nkechi Cantor RN (oncoming nurse) by Marlene Agrawal RN (offgoing nurse). Report included the following information SBAR, Kardex, ED Summary, Procedure Summary, Intake/Output, MAR, Accordion, Recent Results, Med Rec Status and Cardiac Rhythm NSR.   0000:  Bedside and Verbal shift change report given to Aubrey Faustin RN (oncoming nurse) by Nkechi Cantor RN (offgoing nurse). Report included the following information SBAR, Kardex, ED Summary, Procedure Summary, Intake/Output, MAR, Accordion, Recent Results, Med Rec Status and Cardiac Rhythm NSR.

## 2022-04-10 NOTE — PROGRESS NOTES
Physician Progress Note      PATIENT:               Wilfredo Alegria  CSN #:                  [id number]  :                       1934  ADMIT DATE:       2022 11:22 PM  [address] [place] DATE:  RESPONDING  PROVIDER #:        Leonie Contreras MD          QUERY TEXT:    Pt admitted with perforated gastric ulcer. Noted documentation of moderate purulence throughout the foregut in Op Note dated 22. If possible, please document in progress notes and discharge summary if you are evaluating and/or treating any of the following: The medical record reflects the following:  Risk Factors: perforated ulcer    Clinical Indicators:    Feliciano Shook Note -  Moderate purulence throughout the foregut, 3 mm prepyloric antral perforated ulcer    Treatment: placed for interrupted 2-0 Vicryl Lembert sutures to repair this hole; irrigated abdomen; Zosyn 3.375g IV Q 8hrs    Thank you,  Pelon Hyde RN, BSN, CRCR, CCDS, SMART  Clinical Documentation  [phone] or [phone]  Options provided:  -- Peritonitis  -- Moderate purulence not clinically significant  -- Other - I will add my own diagnosis  -- Disagree - Not applicable / Not valid  -- Disagree - Clinically unable to determine / Unknown  -- Refer to Clinical Documentation Reviewer    PROVIDER RESPONSE TEXT:    This patient has peritonitis.     Query created by: Genia Malin on 2022 1:15 PM      Electronically signed by:  Leonie Contreras MD 4/10/2022 3:51 PM

## 2022-04-10 NOTE — PROGRESS NOTES
Bedside shift change report given to Misael Gregory RN (oncoming nurse) by Nyasia Machado RN (offgoing nurse). Report included the following information SBAR, Kardex, Intake/Output and MAR.

## 2022-04-10 NOTE — PROGRESS NOTES
Transition Plan of Care  RUR 11%-Med  Disposition-accepted to [facility] with bed available on Monday 4/11/22. Will likely need a Rapid prior to discharge. Will place on will call with [facility] for 4/11/22.

## 2022-04-11 ENCOUNTER — APPOINTMENT (OUTPATIENT)
Dept: GENERAL RADIOLOGY | Age: 87
DRG: 326 | End: 2022-04-11
Attending: SURGERY
Payer: MEDICARE

## 2022-04-11 LAB
ANION GAP SERPL CALC-SCNC: 9 MMOL/L (ref 5–15)
BASOPHILS # BLD: 0 K/UL (ref 0–0.1)
BASOPHILS NFR BLD: 0 % (ref 0–1)
BUN SERPL-MCNC: 12 MG/DL (ref 6–20)
BUN/CREAT SERPL: 13 (ref 12–20)
CALCIUM SERPL-MCNC: 8.8 MG/DL (ref 8.5–10.1)
CHLORIDE SERPL-SCNC: 104 MMOL/L (ref 97–108)
CO2 SERPL-SCNC: 24 MMOL/L (ref 21–32)
CREAT SERPL-MCNC: 0.92 MG/DL (ref 0.55–1.02)
DIFFERENTIAL METHOD BLD: ABNORMAL
EOSINOPHIL # BLD: 0.2 K/UL (ref 0–0.4)
EOSINOPHIL NFR BLD: 2 % (ref 0–7)
ERYTHROCYTE [DISTWIDTH] IN BLOOD BY AUTOMATED COUNT: 14.2 % (ref 11.5–14.5)
GLUCOSE SERPL-MCNC: 87 MG/DL (ref 65–100)
HCT VFR BLD AUTO: 34 % (ref 35–47)
HGB BLD-MCNC: 11.5 G/DL (ref 11.5–16)
IMM GRANULOCYTES # BLD AUTO: 0.2 K/UL (ref 0–0.04)
IMM GRANULOCYTES NFR BLD AUTO: 2 % (ref 0–0.5)
LYMPHOCYTES # BLD: 1.1 K/UL (ref 0.8–3.5)
LYMPHOCYTES NFR BLD: 12 % (ref 12–49)
MCH RBC QN AUTO: 29.4 PG (ref 26–34)
MCHC RBC AUTO-ENTMCNC: 33.8 G/DL (ref 30–36.5)
MCV RBC AUTO: 87 FL (ref 80–99)
MONOCYTES # BLD: 0.8 K/UL (ref 0–1)
MONOCYTES NFR BLD: 9 % (ref 5–13)
NEUTS SEG # BLD: 7 K/UL (ref 1.8–8)
NEUTS SEG NFR BLD: 75 % (ref 32–75)
NRBC # BLD: 0 K/UL (ref 0–0.01)
NRBC BLD-RTO: 0 PER 100 WBC
PLATELET # BLD AUTO: 231 K/UL (ref 150–400)
PMV BLD AUTO: 9.6 FL (ref 8.9–12.9)
POTASSIUM SERPL-SCNC: 2.8 MMOL/L (ref 3.5–5.1)
RBC # BLD AUTO: 3.91 M/UL (ref 3.8–5.2)
SODIUM SERPL-SCNC: 137 MMOL/L (ref 136–145)
WBC # BLD AUTO: 9.3 K/UL (ref 3.6–11)

## 2022-04-11 PROCEDURE — 65660000001 HC RM ICU INTERMED STEPDOWN

## 2022-04-11 PROCEDURE — 74011000258 HC RX REV CODE- 258: Performed by: SURGERY

## 2022-04-11 PROCEDURE — 74011250637 HC RX REV CODE- 250/637: Performed by: INTERNAL MEDICINE

## 2022-04-11 PROCEDURE — 74011250637 HC RX REV CODE- 250/637: Performed by: SURGERY

## 2022-04-11 PROCEDURE — 74011000250 HC RX REV CODE- 250: Performed by: SURGERY

## 2022-04-11 PROCEDURE — 73060 X-RAY EXAM OF HUMERUS: CPT

## 2022-04-11 PROCEDURE — 85025 COMPLETE CBC W/AUTO DIFF WBC: CPT

## 2022-04-11 PROCEDURE — 74011250637 HC RX REV CODE- 250/637: Performed by: HOSPITALIST

## 2022-04-11 PROCEDURE — 74011250636 HC RX REV CODE- 250/636: Performed by: SURGERY

## 2022-04-11 PROCEDURE — 74011250636 HC RX REV CODE- 250/636: Performed by: INTERNAL MEDICINE

## 2022-04-11 PROCEDURE — 74011250636 HC RX REV CODE- 250/636: Performed by: HOSPITALIST

## 2022-04-11 PROCEDURE — 36415 COLL VENOUS BLD VENIPUNCTURE: CPT

## 2022-04-11 PROCEDURE — 80048 BASIC METABOLIC PNL TOTAL CA: CPT

## 2022-04-11 PROCEDURE — 99024 POSTOP FOLLOW-UP VISIT: CPT | Performed by: SURGERY

## 2022-04-11 PROCEDURE — C9113 INJ PANTOPRAZOLE SODIUM, VIA: HCPCS | Performed by: SURGERY

## 2022-04-11 RX ORDER — POTASSIUM CHLORIDE 7.45 MG/ML
10 INJECTION INTRAVENOUS
Status: COMPLETED | OUTPATIENT
Start: 2022-04-11 | End: 2022-04-11

## 2022-04-11 RX ORDER — FLUCONAZOLE 100 MG/1
200 TABLET ORAL DAILY
Status: DISCONTINUED | OUTPATIENT
Start: 2022-04-12 | End: 2022-04-14 | Stop reason: HOSPADM

## 2022-04-11 RX ORDER — AMOXICILLIN AND CLAVULANATE POTASSIUM 875; 125 MG/1; MG/1
1 TABLET, FILM COATED ORAL EVERY 12 HOURS
Status: DISCONTINUED | OUTPATIENT
Start: 2022-04-11 | End: 2022-04-13 | Stop reason: DRUGHIGH

## 2022-04-11 RX ORDER — POTASSIUM CHLORIDE 7.45 MG/ML
10 INJECTION INTRAVENOUS
Status: ACTIVE | OUTPATIENT
Start: 2022-04-11 | End: 2022-04-11

## 2022-04-11 RX ORDER — LOSARTAN POTASSIUM 25 MG/1
25 TABLET ORAL DAILY
Status: DISCONTINUED | OUTPATIENT
Start: 2022-04-12 | End: 2022-04-14 | Stop reason: HOSPADM

## 2022-04-11 RX ADMIN — POTASSIUM CHLORIDE 10 MEQ: 7.46 INJECTION, SOLUTION INTRAVENOUS at 16:00

## 2022-04-11 RX ADMIN — FUROSEMIDE 40 MG: 10 INJECTION, SOLUTION INTRAVENOUS at 17:38

## 2022-04-11 RX ADMIN — SODIUM CHLORIDE, PRESERVATIVE FREE 40 MG: 5 INJECTION INTRAVENOUS at 06:48

## 2022-04-11 RX ADMIN — AMOXICILLIN AND CLAVULANATE POTASSIUM 1 TABLET: 875; 125 TABLET, FILM COATED ORAL at 22:20

## 2022-04-11 RX ADMIN — ACETAMINOPHEN 650 MG: 325 TABLET ORAL at 08:36

## 2022-04-11 RX ADMIN — POTASSIUM CHLORIDE 10 MEQ: 7.46 INJECTION, SOLUTION INTRAVENOUS at 17:36

## 2022-04-11 RX ADMIN — SODIUM CHLORIDE, PRESERVATIVE FREE 40 MG: 5 INJECTION INTRAVENOUS at 16:29

## 2022-04-11 RX ADMIN — POTASSIUM CHLORIDE 10 MEQ: 7.46 INJECTION, SOLUTION INTRAVENOUS at 08:17

## 2022-04-11 RX ADMIN — AMOXICILLIN AND CLAVULANATE POTASSIUM 1 TABLET: 875; 125 TABLET, FILM COATED ORAL at 14:01

## 2022-04-11 RX ADMIN — POTASSIUM CHLORIDE 10 MEQ: 7.46 INJECTION, SOLUTION INTRAVENOUS at 18:27

## 2022-04-11 RX ADMIN — ACETAMINOPHEN 650 MG: 325 TABLET ORAL at 20:12

## 2022-04-11 RX ADMIN — POTASSIUM CHLORIDE 10 MEQ: 7.46 INJECTION, SOLUTION INTRAVENOUS at 09:40

## 2022-04-11 RX ADMIN — SODIUM CHLORIDE, PRESERVATIVE FREE 10 ML: 5 INJECTION INTRAVENOUS at 14:18

## 2022-04-11 RX ADMIN — FLUCONAZOLE 200 MG: 2 INJECTION, SOLUTION INTRAVENOUS at 06:48

## 2022-04-11 RX ADMIN — HYDROCHLOROTHIAZIDE 25 MG: 25 TABLET ORAL at 08:45

## 2022-04-11 RX ADMIN — SODIUM CHLORIDE, PRESERVATIVE FREE 10 ML: 5 INJECTION INTRAVENOUS at 06:49

## 2022-04-11 RX ADMIN — PIPERACILLIN SODIUM AND TAZOBACTAM SODIUM 3.38 G: 3; 375 INJECTION, POWDER, FOR SOLUTION INTRAVENOUS at 02:55

## 2022-04-11 RX ADMIN — AMLODIPINE BESYLATE 10 MG: 5 TABLET ORAL at 08:45

## 2022-04-11 RX ADMIN — POTASSIUM CHLORIDE 10 MEQ: 7.46 INJECTION, SOLUTION INTRAVENOUS at 12:32

## 2022-04-11 RX ADMIN — POTASSIUM CHLORIDE 10 MEQ: 7.46 INJECTION, SOLUTION INTRAVENOUS at 10:48

## 2022-04-11 RX ADMIN — FUROSEMIDE 40 MG: 10 INJECTION, SOLUTION INTRAVENOUS at 08:36

## 2022-04-11 NOTE — PROGRESS NOTES
Bedside shift change report given to Renu Wei RN (oncoming nurse) by Karon Yoon RN (offgoing nurse). Report included the following information SBAR, Kardex, Intake/Output and MAR. Patient disoriented d/t dementia and fell out of bed today. No apparent injury, tylenol given. Family at bedside. Bed alarm failed, pad alarm turned on. Patient has new IV placed see avatar. Patient had one soft bowel movement today. Patient frequently removes gown, socks, and monitoring equipment. Patient is pleasant and cooperative just confused.

## 2022-04-11 NOTE — PROGRESS NOTES
Hospitalist Progress Note      Hospital summary: 80 y.o lady w/ dementia, HTN, history of pacemaker placement, who is here for perforated gastric ulcer s/p open surgical repair. Assessment/Plan:  HTN: remains hypertensive but overall improving  -continue amlodipine. HCTZ added by cardiology - need to watch serum K    Flash pulmonary edema: due to acute diastolic CHF  -IV fluids held  -echocardiogram w/ severe hypertrophy and diastolic dysfunction  -per cardiology    Hypokalemia:   -repletion ordered - 40 IV now and 40 IV in the evening. She is on HCTZ and Lasix now - watch closely    Elevated serum troponin: not ACS    Perforated gastric ulcer s/p open repair: per surgery    ----------------------------------------------    CC: f/u HTN    S: no acute complaints, feels better, denies pain, dyspnea, n/v/d     Review of Systems:  Pertinent items are noted in HPI.     O:  Visit Vitals  BP (!) 143/70 (BP 1 Location: Right upper arm, BP Patient Position: At rest)   Pulse 94   Temp 98 °F (36.7 °C)   Resp 21   Ht 5' 1\" (1.549 m)   Wt 49.4 kg (109 lb)   SpO2 99%   BMI 20.60 kg/m²       PHYSICAL EXAM:  Gen: NAD, non-toxic  HEENT: anicteric sclerae, normal conjunctiva  Neck: supple, trachea midline, no adenopathy  Heart: RRR, no MRG, no JVD, no peripheral edema  Lungs: CTA b/l anteriorly, non-labored respirations on O2  Abd: soft, surgical scars dressed, ND, BS+  Extr: warm  Skin: dry, no rash  Neuro: CN II-XII grossly intact, normal speech, moves all extremities  Psych: normal mood, appropriate affect      Intake/Output Summary (Last 24 hours) at 4/11/2022 1126  Last data filed at 4/10/2022 2035  Gross per 24 hour   Intake    Output 1280 ml   Net -1280 ml        Recent labs & imaging reviewed:  Recent Results (from the past 24 hour(s))   CBC WITH AUTOMATED DIFF    Collection Time: 04/11/22  6:14 AM   Result Value Ref Range    WBC 9.3 3.6 - 11.0 K/uL    RBC 3.91 3.80 - 5.20 M/uL    HGB 11.5 11.5 - 16.0 g/dL    HCT 34.0 (L) 35.0 - 47.0 %    MCV 87.0 80.0 - 99.0 FL    MCH 29.4 26.0 - 34.0 PG    MCHC 33.8 30.0 - 36.5 g/dL    RDW 14.2 11.5 - 14.5 %    PLATELET 521 892 - 100 K/uL    MPV 9.6 8.9 - 12.9 FL    NRBC 0.0 0  WBC    ABSOLUTE NRBC 0.00 0.00 - 0.01 K/uL    NEUTROPHILS 75 32 - 75 %    LYMPHOCYTES 12 12 - 49 %    MONOCYTES 9 5 - 13 %    EOSINOPHILS 2 0 - 7 %    BASOPHILS 0 0 - 1 %    IMMATURE GRANULOCYTES 2 (H) 0.0 - 0.5 %    ABS. NEUTROPHILS 7.0 1.8 - 8.0 K/UL    ABS. LYMPHOCYTES 1.1 0.8 - 3.5 K/UL    ABS. MONOCYTES 0.8 0.0 - 1.0 K/UL    ABS. EOSINOPHILS 0.2 0.0 - 0.4 K/UL    ABS. BASOPHILS 0.0 0.0 - 0.1 K/UL    ABS. IMM. GRANS. 0.2 (H) 0.00 - 0.04 K/UL    DF AUTOMATED     METABOLIC PANEL, BASIC    Collection Time: 04/11/22  6:14 AM   Result Value Ref Range    Sodium 137 136 - 145 mmol/L    Potassium 2.8 (L) 3.5 - 5.1 mmol/L    Chloride 104 97 - 108 mmol/L    CO2 24 21 - 32 mmol/L    Anion gap 9 5 - 15 mmol/L    Glucose 87 65 - 100 mg/dL    BUN 12 6 - 20 MG/DL    Creatinine 0.92 0.55 - 1.02 MG/DL    BUN/Creatinine ratio 13 12 - 20      GFR est AA >60 >60 ml/min/1.73m2    GFR est non-AA 58 (L) >60 ml/min/1.73m2    Calcium 8.8 8.5 - 10.1 MG/DL     Recent Labs     04/11/22  0614 04/10/22  0253   WBC 9.3 8.0   HGB 11.5 10.7*   HCT 34.0* 32.0*    193     Recent Labs     04/11/22  0614 04/10/22  0253 04/09/22  0231    140 137   K 2.8* 2.8* 2.9*    111* 105   CO2 24 17* 23   BUN 12 11 8   CREA 0.92 0.72 0.87   GLU 87 63* 78   CA 8.8 8.1* 8.7   MG  --  1.8  --      No results for input(s): ALT, AP, TBIL, TBILI, TP, ALB, GLOB, GGT, AML, LPSE in the last 72 hours. No lab exists for component: SGOT, GPT, AMYP, HLPSE  No results for input(s): INR, PTP, APTT, INREXT, INREXT in the last 72 hours. No results for input(s): FE, TIBC, PSAT, FERR in the last 72 hours.    No results found for: FOL, RBCF   Recent Labs     04/08/22  1725   PH 7.52*   PCO2 26*   PO2 68*     No results for input(s): CPK, CKNDX, TROIQ in the last 72 hours.     No lab exists for component: CPKMB  No results found for: CHOL, CHOLX, CHLST, CHOLV, HDL, HDLP, LDL, LDLC, DLDLP, TGLX, TRIGL, TRIGP, CHHD, CHHDX  Lab Results   Component Value Date/Time    Glucose (POC) 69 04/09/2022 08:31 AM    Glucose (POC) 134 (H) 04/06/2022 09:14 PM     Lab Results   Component Value Date/Time    Color YELLOW/STRAW 04/05/2022 11:40 PM    Appearance CLEAR 04/05/2022 11:40 PM    Specific gravity >1.030 (H) 04/05/2022 11:40 PM    pH (UA) 5.5 04/05/2022 11:40 PM    Protein TRACE (A) 04/05/2022 11:40 PM    Glucose Negative 04/05/2022 11:40 PM    Ketone 15 (A) 04/05/2022 11:40 PM    Bilirubin Negative 04/05/2022 11:40 PM    Urobilinogen 0.2 04/05/2022 11:40 PM    Nitrites Negative 04/05/2022 11:40 PM    Leukocyte Esterase Negative 04/05/2022 11:40 PM    Epithelial cells FEW 04/05/2022 11:40 PM    Bacteria Negative 04/05/2022 11:40 PM    WBC 0-4 04/05/2022 11:40 PM    RBC 0-5 04/05/2022 11:40 PM       Med list reviewed  Current Facility-Administered Medications   Medication Dose Route Frequency    potassium chloride 10 mEq in 100 ml IVPB  10 mEq IntraVENous Q1H    potassium chloride 10 mEq in 100 ml IVPB  10 mEq IntraVENous Q1H    furosemide (LASIX) injection 40 mg  40 mg IntraVENous BID    labetaloL (NORMODYNE;TRANDATE) injection 20 mg  20 mg IntraVENous Q6H PRN    amLODIPine (NORVASC) tablet 10 mg  10 mg Oral DAILY    hydroCHLOROthiazide (HYDRODIURIL) tablet 25 mg  25 mg Oral DAILY    hydrALAZINE (APRESOLINE) tablet 50 mg  50 mg Oral Q6H PRN    albuterol-ipratropium (DUO-NEB) 2.5 MG-0.5 MG/3 ML  3 mL Nebulization Q4H PRN    simethicone (MYLICON) 83CZ/0.1PG oral drops 20 mg  20 mg Oral QID PRN    LORazepam (ATIVAN) injection 1 mg  1 mg IntraVENous Q6H PRN    pantoprazole (PROTONIX) 40 mg in 0.9% sodium chloride 10 mL injection  40 mg IntraVENous Q12H    piperacillin-tazobactam (ZOSYN) 3.375 g in 0.9% sodium chloride (MBP/ADV) 100 mL MBP 3.375 g IntraVENous Q8H    sodium chloride (NS) flush 5-40 mL  5-40 mL IntraVENous Q8H    sodium chloride (NS) flush 5-40 mL  5-40 mL IntraVENous PRN    acetaminophen (TYLENOL) tablet 650 mg  650 mg Oral Q6H PRN    Or    acetaminophen (TYLENOL) suppository 650 mg  650 mg Rectal Q6H PRN    polyethylene glycol (MIRALAX) packet 17 g  17 g Oral DAILY PRN    promethazine (PHENERGAN) tablet 12.5 mg  12.5 mg Oral Q6H PRN    Or    ondansetron (ZOFRAN) injection 4 mg  4 mg IntraVENous Q4H PRN    morphine injection 2-4 mg  2-4 mg IntraVENous Q2H PRN    fluconazole (DIFLUCAN) 200mg/100 mL IVPB (premix)  200 mg IntraVENous Q24H       Care Plan discussed with:  Patient/Family, Nurse and     Mechelle Napier MD  Internal Medicine  Date of Service: 4/11/2022

## 2022-04-11 NOTE — ANCILLARY DISCHARGE INSTRUCTIONS
Occupational Therapy    Patient chart reviewed up to date. RN requesting to defer at this time - patient with fall yesterday, up throughout the night with severe UE pain and pending imaging. Will hold and continue to follow as appropriate. Thank you.   Rand Rai OTR/L

## 2022-04-11 NOTE — PROGRESS NOTES
Physical Therapy 4/11/2022    Chart reviewed, spoke with RN. Per nurse, patient sustained a fall last night and now with c/o significant LUE pain. Awaiting x-ray of LUE. Patient with high pain and fatigue this AM. Request to defer treatment at this time. Will follow up later as able.     Thank you for your consideration,    Zoila Tuttle, PT, DPT

## 2022-04-11 NOTE — PROGRESS NOTES
Cardiology Progress Note  2022     Admit Date: 2022  Admit Diagnosis: Perforated gastric ulcer ([facility] [K25.5]  CC: none currently    Assessment:   Active Problems:    Perforated gastric ulcer ([facility] (2022)      Plan:     Added losartan  IV furosemide finishing today  Echo reviewed  Daily labs  Cont other cardiac meds  Signing off, please call with questions    Subjective:      Huyen Crawford cont to slowly improve    Objective:    Physical Exam:  Overall VSSAF;    Visit Vitals  /67 (BP 1 Location: Right upper arm, BP Patient Position: At rest;Lying)   Pulse 91   Temp 98.1 °F (36.7 °C)   Resp 19   Ht 5' 1\" (1.549 m)   Wt 49.4 kg (109 lb)   SpO2 98%   BMI 20.60 kg/m²     Temp (24hrs), Av.1 °F (36.7 °C), Min:98 °F (36.7 °C), Max:98.1 °F (36.7 °C)    Patient Vitals for the past 8 hrs:   Pulse   22 1209 91   22 1003 94   22 1000 93   22 0836 88   22 0545 (!) 103    Patient Vitals for the past 8 hrs:   Resp   22 1209 19   22 1003 21    Patient Vitals for the past 8 hrs:   BP   22 1209 136/67   22 1003 (!) 143/70   22 0836 (!) 143/67      / 1901 -  0700  In: 1100 [I.V.:1100]  Out: 2180 [Urine:2150; Drains:30]      General Appearance: Well developed, well nourished, no acute distress. Ears/Nose/Mouth/Throat:   Normal MM; anicteric. JVP: WNL   Resp:   Lungs clear to auscultation bilaterally. Nl resp effort. Cardiovascular:  RRR, S1, S2 normal, no new murmur. No gallop or rub. Abdomen:   Soft, non-tender, bowel sounds are present. Extremities: No edema bilaterally. Skin:  Neuro: Warm and dry.   A/O x3, grossly nonfocal                         Data Review:     Telemetry independently reviewed :   normal sinus rhythm       ECG independently reviewed: NSR  Labs:   Recent Results (from the past 24 hour(s))   CBC WITH AUTOMATED DIFF    Collection Time: 22  6:14 AM   Result Value Ref Range    WBC 9.3 3.6 - 11.0 K/uL    RBC 3. 91 3.80 - 5.20 M/uL    HGB 11.5 11.5 - 16.0 g/dL    HCT 34.0 (L) 35.0 - 47.0 %    MCV 87.0 80.0 - 99.0 FL    MCH 29.4 26.0 - 34.0 PG    MCHC 33.8 30.0 - 36.5 g/dL    RDW 14.2 11.5 - 14.5 %    PLATELET 012 888 - 539 K/uL    MPV 9.6 8.9 - 12.9 FL    NRBC 0.0 0  WBC    ABSOLUTE NRBC 0.00 0.00 - 0.01 K/uL    NEUTROPHILS 75 32 - 75 %    LYMPHOCYTES 12 12 - 49 %    MONOCYTES 9 5 - 13 %    EOSINOPHILS 2 0 - 7 %    BASOPHILS 0 0 - 1 %    IMMATURE GRANULOCYTES 2 (H) 0.0 - 0.5 %    ABS. NEUTROPHILS 7.0 1.8 - 8.0 K/UL    ABS. LYMPHOCYTES 1.1 0.8 - 3.5 K/UL    ABS. MONOCYTES 0.8 0.0 - 1.0 K/UL    ABS. EOSINOPHILS 0.2 0.0 - 0.4 K/UL    ABS. BASOPHILS 0.0 0.0 - 0.1 K/UL    ABS. IMM.  GRANS. 0.2 (H) 0.00 - 0.04 K/UL    DF AUTOMATED     METABOLIC PANEL, BASIC    Collection Time: 04/11/22  6:14 AM   Result Value Ref Range    Sodium 137 136 - 145 mmol/L    Potassium 2.8 (L) 3.5 - 5.1 mmol/L    Chloride 104 97 - 108 mmol/L    CO2 24 21 - 32 mmol/L    Anion gap 9 5 - 15 mmol/L    Glucose 87 65 - 100 mg/dL    BUN 12 6 - 20 MG/DL    Creatinine 0.92 0.55 - 1.02 MG/DL    BUN/Creatinine ratio 13 12 - 20      GFR est AA >60 >60 ml/min/1.73m2    GFR est non-AA 58 (L) >60 ml/min/1.73m2    Calcium 8.8 8.5 - 10.1 MG/DL      Current medications reviewed       Gloria Peralta MD

## 2022-04-11 NOTE — PROGRESS NOTES
Surgery Progress Note    4/11/2022    Admit Date: 4/5/2022    CC: Abdominal pain    POD: 5 open repair of perforated gastric ulcer    Subjective: Fall last night and hit left arm. Baseline dementia. Drinking some. Constitutional: No fever or chills  Neurologic: No headache  Eyes: No scleral icterus or irritated eyes  Nose: No nasal pain or drainage  Mouth: No oral lesions or sore throat  Cardiac: No palpations or chest pain  Pulmonary: No cough or shortness of breath  Gastrointestinal: Abdominal pain, no nausea, emesis, diarrhea, or constipation  Genitourinary: No dysuria  Musculoskeletal: Left arm pain  Skin: No rashes or lesions  Psychiatric: No anxiety or depressed mood    Objective:     Visit Vitals  BP (!) 143/67   Pulse 88   Temp 98.1 °F (36.7 °C)   Resp 21   Ht 5' 1\" (1.549 m)   Wt 109 lb (49.4 kg)   SpO2 96%   BMI 20.60 kg/m²       General: No acute distress, conversant  Eyes: PERRLA, no scleral icterus  HENT: Normocephalic without oral lesions  Neck: Trachea midline without LAD  Cardiac: Normal pulse rate and rhythm  Pulmonary: Symmetric chest rise with normal effort  GI: Soft, ATTP, wound cdi  Skin: Warm without rash  Extremities: Left upper arm tenderness  Psych: Appropriate mood and affect    Labs, vital signs, and I/O reviewed. Assessment:     27-year-old female doing fair after open repair of perforated gastric ulcer    Plan:     PRN pain control  Hold IVF 2/2 CHF  Fulls for two weeks, BID PPI  Cont abx and antigungal  Lovenox  Ambulate  XR left arm  DC drain  DC planning.     Juan New MD  Bariatric and General Surgeon  [facility]

## 2022-04-12 ENCOUNTER — APPOINTMENT (OUTPATIENT)
Dept: GENERAL RADIOLOGY | Age: 87
DRG: 326 | End: 2022-04-12
Attending: HOSPITALIST
Payer: MEDICARE

## 2022-04-12 LAB
ANION GAP SERPL CALC-SCNC: 7 MMOL/L (ref 5–15)
BUN SERPL-MCNC: 14 MG/DL (ref 6–20)
BUN/CREAT SERPL: 15 (ref 12–20)
CALCIUM SERPL-MCNC: 9.8 MG/DL (ref 8.5–10.1)
CHLORIDE SERPL-SCNC: 102 MMOL/L (ref 97–108)
CO2 SERPL-SCNC: 26 MMOL/L (ref 21–32)
COVID-19 RAPID TEST, COVR: NOT DETECTED
CREAT SERPL-MCNC: 0.93 MG/DL (ref 0.55–1.02)
GLUCOSE SERPL-MCNC: 109 MG/DL (ref 65–100)
POTASSIUM SERPL-SCNC: 3.2 MMOL/L (ref 3.5–5.1)
SODIUM SERPL-SCNC: 135 MMOL/L (ref 136–145)
SOURCE, COVRS: NORMAL

## 2022-04-12 PROCEDURE — C9113 INJ PANTOPRAZOLE SODIUM, VIA: HCPCS | Performed by: SURGERY

## 2022-04-12 PROCEDURE — 74011250636 HC RX REV CODE- 250/636: Performed by: SURGERY

## 2022-04-12 PROCEDURE — 36415 COLL VENOUS BLD VENIPUNCTURE: CPT

## 2022-04-12 PROCEDURE — 80048 BASIC METABOLIC PNL TOTAL CA: CPT

## 2022-04-12 PROCEDURE — 99024 POSTOP FOLLOW-UP VISIT: CPT | Performed by: SURGERY

## 2022-04-12 PROCEDURE — 74011000250 HC RX REV CODE- 250: Performed by: SURGERY

## 2022-04-12 PROCEDURE — 74011250637 HC RX REV CODE- 250/637: Performed by: SURGERY

## 2022-04-12 PROCEDURE — 71045 X-RAY EXAM CHEST 1 VIEW: CPT

## 2022-04-12 PROCEDURE — 74011250637 HC RX REV CODE- 250/637: Performed by: INTERNAL MEDICINE

## 2022-04-12 PROCEDURE — 65660000001 HC RM ICU INTERMED STEPDOWN

## 2022-04-12 PROCEDURE — 74011250637 HC RX REV CODE- 250/637: Performed by: HOSPITALIST

## 2022-04-12 PROCEDURE — 74011250636 HC RX REV CODE- 250/636: Performed by: HOSPITALIST

## 2022-04-12 PROCEDURE — 97530 THERAPEUTIC ACTIVITIES: CPT

## 2022-04-12 PROCEDURE — 87635 SARS-COV-2 COVID-19 AMP PRB: CPT

## 2022-04-12 RX ORDER — AMLODIPINE BESYLATE 10 MG/1
10 TABLET ORAL DAILY
Qty: 30 TABLET | Refills: 1 | Status: SHIPPED | OUTPATIENT
Start: 2022-04-13 | End: 2022-04-12

## 2022-04-12 RX ORDER — OMEPRAZOLE 40 MG/1
40 CAPSULE, DELAYED RELEASE ORAL 2 TIMES DAILY
Qty: 28 CAPSULE | Refills: 0 | Status: SHIPPED | OUTPATIENT
Start: 2022-04-12 | End: 2022-04-26

## 2022-04-12 RX ORDER — FLUCONAZOLE 200 MG/1
200 TABLET ORAL DAILY
Qty: 7 TABLET | Refills: 0 | Status: SHIPPED | OUTPATIENT
Start: 2022-04-13 | End: 2022-04-20

## 2022-04-12 RX ORDER — AMLODIPINE BESYLATE 5 MG/1
2.5 TABLET ORAL DAILY
Status: DISCONTINUED | OUTPATIENT
Start: 2022-04-13 | End: 2022-04-13

## 2022-04-12 RX ORDER — POTASSIUM CHLORIDE 7.45 MG/ML
10 INJECTION INTRAVENOUS
Status: COMPLETED | OUTPATIENT
Start: 2022-04-12 | End: 2022-04-12

## 2022-04-12 RX ORDER — AMLODIPINE BESYLATE 10 MG/1
TABLET ORAL
Qty: 90 TABLET | Refills: 1 | Status: SHIPPED | OUTPATIENT
Start: 2022-04-12 | End: 2022-04-14

## 2022-04-12 RX ORDER — AMOXICILLIN AND CLAVULANATE POTASSIUM 875; 125 MG/1; MG/1
1 TABLET, FILM COATED ORAL EVERY 12 HOURS
Qty: 14 TABLET | Refills: 0 | Status: SHIPPED | OUTPATIENT
Start: 2022-04-12 | End: 2022-04-19

## 2022-04-12 RX ORDER — PANTOPRAZOLE SODIUM 40 MG/1
40 TABLET, DELAYED RELEASE ORAL
Status: DISCONTINUED | OUTPATIENT
Start: 2022-04-12 | End: 2022-04-14 | Stop reason: HOSPADM

## 2022-04-12 RX ADMIN — POTASSIUM CHLORIDE 10 MEQ: 7.46 INJECTION, SOLUTION INTRAVENOUS at 11:22

## 2022-04-12 RX ADMIN — SODIUM CHLORIDE, PRESERVATIVE FREE 10 ML: 5 INJECTION INTRAVENOUS at 22:53

## 2022-04-12 RX ADMIN — SODIUM CHLORIDE, PRESERVATIVE FREE 40 MG: 5 INJECTION INTRAVENOUS at 09:03

## 2022-04-12 RX ADMIN — POTASSIUM CHLORIDE 10 MEQ: 7.46 INJECTION, SOLUTION INTRAVENOUS at 12:50

## 2022-04-12 RX ADMIN — LOSARTAN POTASSIUM 25 MG: 25 TABLET, FILM COATED ORAL at 10:47

## 2022-04-12 RX ADMIN — POTASSIUM CHLORIDE 10 MEQ: 7.46 INJECTION, SOLUTION INTRAVENOUS at 15:22

## 2022-04-12 RX ADMIN — POTASSIUM CHLORIDE 10 MEQ: 7.46 INJECTION, SOLUTION INTRAVENOUS at 14:02

## 2022-04-12 RX ADMIN — AMOXICILLIN AND CLAVULANATE POTASSIUM 1 TABLET: 875; 125 TABLET, FILM COATED ORAL at 22:03

## 2022-04-12 RX ADMIN — FLUCONAZOLE 200 MG: 100 TABLET ORAL at 10:47

## 2022-04-12 RX ADMIN — PANTOPRAZOLE SODIUM 40 MG: 40 TABLET, DELAYED RELEASE ORAL at 17:19

## 2022-04-12 RX ADMIN — HYDROCHLOROTHIAZIDE 25 MG: 25 TABLET ORAL at 10:47

## 2022-04-12 RX ADMIN — AMOXICILLIN AND CLAVULANATE POTASSIUM 1 TABLET: 875; 125 TABLET, FILM COATED ORAL at 10:47

## 2022-04-12 RX ADMIN — AMLODIPINE BESYLATE 10 MG: 5 TABLET ORAL at 10:47

## 2022-04-12 NOTE — PROGRESS NOTES
Surgery Progress Note    4/12/2022    Admit Date: 4/5/2022    CC: Abdominal pain    POD: 6 open repair of perforated gastric ulcer    Subjective:     Eating well. Mild pain. Left arm x-ray negative for acute injury. Drain removed    Constitutional: No fever or chills  Neurologic: No headache  Eyes: No scleral icterus or irritated eyes  Nose: No nasal pain or drainage  Mouth: No oral lesions or sore throat  Cardiac: No palpations or chest pain  Pulmonary: No cough or shortness of breath  Gastrointestinal: Abdominal pain, no nausea, emesis, diarrhea, or constipation  Genitourinary: No dysuria  Musculoskeletal: Left arm pain improved  Skin: No rashes or lesions  Psychiatric: No anxiety or depressed mood    Objective:     Visit Vitals  /74 (BP Patient Position: Supine)   Pulse 100   Temp 98.4 °F (36.9 °C)   Resp 22   Ht 5' 1\" (1.549 m)   Wt 109 lb (49.4 kg)   SpO2 96%   BMI 20.60 kg/m²       General: No acute distress, conversant  Eyes: PERRLA, no scleral icterus  HENT: Normocephalic without oral lesions  Neck: Trachea midline without LAD  Cardiac: Normal pulse rate and rhythm  Pulmonary: Symmetric chest rise with normal effort  GI: Soft, ATTP, wound cdi  Skin: Warm without rash  Extremities: Left upper arm tenderness  Psych: Appropriate mood and affect    Labs, vital signs, and I/O reviewed. Assessment:     51-year-old female doing fair after open repair of perforated gastric ulcer    Plan:     Continue full liquid diet for 1 more week. Okay to discharge today to SNF with Augmentin and Diflucan along with twice daily PPI. Order placed. We can see her back next week for staple removal.    Tylenol should be okay for pain control for her.     Nasrin Fay MD  Bariatric and General Surgeon  [address] [place] [address] [facility]

## 2022-04-12 NOTE — ACP (ADVANCE CARE PLANNING)
Advance Care Planning     Advance Care Planning (ACP) Physician/NP/PA Conversation      Date of Conversation: 4/5/2022  Conducted with: Patient with Decision Making Capacity and Healthcare Decision Maker: Named in Advance Directive or [address] of [address] [facility]:     Primary Decision Maker: Luis Greene - [place] - [phone]  Click here to [address] including selection of the [place] Relationship (ie \"Primary\")      Today we documented Decision Maker(s) consistent with Legal Next of Kin hierarchy. Care Preferences:    Hospitalization: \"If your health worsens and it becomes clear that your chance of recovery is unlikely, what would be your preference regarding hospitalization? \"  The patient would prefer hospitalization. Ventilation: \"If you were unable to breathe on your own and your chance of recovery was unlikely, what would be your preference about the use of a ventilator (breathing machine) if it was available to you? \"   The patient would NOT desire the use of a ventilator. Resuscitation: \"In the event your heart stopped as a result of an underlying serious health condition, would you want attempts to be made to restart your heart, or would you prefer a natural death? \"   No, do NOT attempt to resuscitate.     Additional topics discussed: treatment goals    Conversation Outcomes / Follow-Up Plan:   ACP complete - no further action today  Reviewed DNR/DNI and patient elects DNR order - referred to [address] [place] [address] Specialist & placed order     Length of Voluntary ACP Conversation in minutes:  16 minutes    Tao Glover MD

## 2022-04-12 NOTE — PROGRESS NOTES
Bedside shift change report given to Stephanie Jj (oncoming nurse) by Minnie Basurto (offgoing nurse). Report included the following information SBAR, Intake/Output, MAR, Recent Results, Med Rec Status and Cardiac Rhythm Paced.

## 2022-04-12 NOTE — PROGRESS NOTES
Problem: Mobility Impaired (Adult and Pediatric)  Goal: *Acute Goals and Plan of Care (Insert Text)  Description: FUNCTIONAL STATUS PRIOR TO ADMISSION: Patient is poor historian and no family bedside at time of evaluation. Per CM note: \"Per daughter, patient was independent with ADL's and mostly independent with IADL's. Patient was no longer cooking, but was able to complete light housekeeping and laundry. Patient was ambulating with the use of a cane and/or rollator depending on amount of assistance needed. Patient was receiving outpatient therapy at [facility] on [address] [facility] 2x/week for PT. \"    HOME SUPPORT PRIOR TO ADMISSION: The patient lived with daughter and required assist as needed with IADLs. Physical Therapy Goals  Initiated 4/7/2022  1. Patient will move from supine to sit and sit to supine  in bed with supervision/set-up while maintaining post-op precautions within 7 day(s). 2.  Patient will transfer from bed to chair and chair to bed with supervision/set-up using the least restrictive device within 7 day(s). 3.  Patient will perform sit to stand with supervision/set-up within 7 day(s). 4.  Patient will ambulate with minimal assistance/contact guard assist for 50 feet with the least restrictive device within 7 day(s). 5.  Patient will ascend/descend 4 stairs with unilateral handrail(s) with minimal assistance/contact guard assist within 7 day(s). Outcome: Progressing Towards Goal     PHYSICAL THERAPY TREATMENT  Patient: Khadijah Barbosa (63 y.o. female)  Date: 4/12/2022  Diagnosis: Perforated gastric ulcer ([facility] [K25.5] <principal problem not specified>  Procedure(s) (LRB):  LAPAROSCOPY GENERAL DIAGNOSTIC, perforated ulcer repair (N/A) 6 Days Post-Op  Precautions: Fall  Chart, physical therapy assessment, plan of care and goals were reviewed. ASSESSMENT  Patient continues with skilled PT services and is progressing towards goals, but slowly.  Today, patient demonstrating some improvements in cognition as she was more alert and participatory and knew she was in the hospital but remains confused and below baseline limitations in cognition per daughter. Patient able to perform bed mobility and sit<>stand with moderate assistance of 2 overall this date but limited by symptomatic orthostatic hypotension and further mobility deferred. Patient and daughter educated on importance of upright in bed as tolerated (and was discussed with nurse) - daughter reports understanding. Patient with strong posterior lean in standing and remains at a high risk for falls. Recommend patient discharge to subacute rehab to maximize functional potential and decrease risk of falls. Acute PT will continue to follow and progress as able. Current Level of Function Impacting Discharge (mobility/balance): moderate assist x 2 for bed mobility, posterior lean in standing with walker and assist.    Other factors to consider for discharge: supportive family, dementia at baseline         PLAN :  Patient continues to benefit from skilled intervention to address the above impairments. Continue treatment per established plan of care. to address goals. Recommendation for discharge: (in order for the patient to meet his/her long term goals)  Therapy up to 5 days/week in SNF setting    This discharge recommendation:  Has not yet been discussed the attending provider and/or case management    IF patient discharges home will need the following DME: to be determined (TBD)       SUBJECTIVE:   Patient stated I'm dizzy.     OBJECTIVE DATA SUMMARY:   Critical Behavior:  Neurologic State: Alert,Confused  Orientation Level: Oriented to person,Oriented to place,Disoriented to time,Disoriented to situation  Cognition: Decreased command following,Decreased attention/concentration,Impulsive,Impaired decision making,Poor safety awareness  Safety/Judgement: Decreased awareness of environment,Decreased insight into deficits,Decreased awareness of need for safety  Functional Mobility Training:  Bed Mobility:  Supine to Sit: Moderate assistance;Assist x2; Additional time (cues)  Sit to Supine: Moderate assistance;Assist x2; Other (comment) (cues)  Scooting: Moderate assistance  Transfers:  Sit to Stand: Moderate assistance (rolling walker, cues)  Stand to Sit: Moderate assistance (rolling walker, cues)  Balance:  Sitting: Impaired  Sitting - Static: Good (unsupported); Fair (occasional) (assist with fatigue)  Sitting - Dynamic: Fair (occasional) (scooting along edge of bed)  Standing: Impaired  Standing - Static: Constant support;Poor (significant posterior lean)    Pain Rating:  Patient reports hip pain (daughter reports this is baseline) but did complain of post-op pain during session    Activity Tolerance:   Poor and signs and symptoms of orthostatic hypotension (RN aware)     04/12/22 1056 04/12/22 1059 04/12/22 1103   Vital Signs   Pulse (Heart Rate)  --  (!) 110 (!) 126   /70  (supine) 110/77  (sitting) (!) 72/50  (standing)   MAP (Calculated) 87 88 (!) 57   BP Patient Position Supine Sitting Standing      04/12/22 1106   Vital Signs   Pulse (Heart Rate) 100   /74   MAP (Calculated) 87   BP Patient Position Supine       After treatment patient left in no apparent distress:   Supine in bed, Call bell within reach, Bed / chair alarm activated and Caregiver / family present    COMMUNICATION/COLLABORATION:   The patients plan of care was discussed with: Registered nurse.      Jose White, PT   Time Calculation: 26 mins

## 2022-04-12 NOTE — PROGRESS NOTES
0800: Bedside and Verbal shift change report given to Cozette Dance, KAM (oncoming nurse) by Brooke Sterling RN (offgoing nurse). Report included the following information SBAR, Kardex, MAR and Recent Results. 1100: Pt orthostatic after working with PT; MD harris, zackary noted read. VSS post therapy. 1630: Bedside and Verbal shift change report given to Daksha (oncoming nurse) by Cozette Dance, RN (offgoing nurse). Report included the following information SBAR, Kardex, MAR and Recent Results.

## 2022-04-12 NOTE — PROGRESS NOTES
Spiritual Care Assessment/Progress Note  [facility]      NAME: Lashaun Jaimes      MRN: [id number]  AGE: 80 y.o. SEX: female  Restorationist Affiliation: Catholic   Language: English     4/12/2022     Total Time (in minutes): 17     Spiritual Assessment begun in [facility] through conversation with:         []Patient        [] Family    [] Friend(s)        Reason for Consult: Initial visit     Spiritual beliefs: (Please include comment if needed)     [] Identifies with a anthony tradition:         [] Supported by a anthony community:            [] Claims no spiritual orientation:           [] Seeking spiritual identity:                [] Adheres to an individual form of spirituality:           [x] Not able to assess:                           Identified resources for coping:      [] Prayer                               [] Music                  [] Guided Imagery     [] Family/friends                 [] Pet visits     [] Devotional reading                         [x] Unknown     [] Other:                                               Interventions offered during this visit: (See comments for more details)                Plan of Care:     [] Support spiritual and/or cultural needs    [] Support AMD and/or advance care planning process      [] Support grieving process   [] Coordinate Rites and/or Rituals    [] Coordination with community clergy   [] No spiritual needs identified at this time   [] Detailed Plan of Care below (See Comments)  [] Make referral to Music Therapy  [] Make referral to Pet Therapy     [] Make referral to Addiction services  [] Make referral to [facility]  [] Make referral to Spiritual Care Partner  [] No future visits requested        [] Contact Spiritual Care for further referrals     Comments: Visit for spiritual assessment in Room 404. Patient appeared to be sleeping. Provided ministry of presence and silent prayer. Chart reviewed.   Please contact Spiritual Care for any further referrals. Visited by: Citlali Pierson.  Doc Crum, [address] [place] [address] Road paging Service [phone])

## 2022-04-12 NOTE — PROGRESS NOTES
Hospitalist Progress Note      Hospital summary: 80 y.o lady w/ dementia, HTN, history of pacemaker placement, who is here for perforated gastric ulcer s/p open surgical repair. Assessment/Plan:  HTN: remains hypertensive but overall improving  -continue amlodipine. HCTZ added by cardiology - need to watch serum K    Pulmonary edema: due to acute diastolic/systolic CHF NYHA III  -IV diuresis held  -echo w/ severe hypertrophy and diastolic/systolic dysfunction. EF 30-40%  -Currently holding home metoprolol 25, spironolactone 25, lisinopril 5 mg, lasix 20 mg, pravastatin 40 mg qhs  -Per daughter, the patient had a stress test about 2 years ago  -I will talk to cardiology    Hypokalemia:   -repletion ordered - 40 IV now and 40 IV in the evening. She is on HCTZ and Lasix now - watch closely    Elevated serum troponin: not ACS    Perforated gastric ulcer s/p open repair 4/6: Appreciate surgery, continue full liquids for 1 more week. PPI BID. Augmentin/diflucan. Cleared from surgery standpoint for discharge. Rheumatoid arthritis- at home on prednisone 5mg/Plaquenil 200 mg. Spoke to surgery, hold prednisone for 1 more week and then restart  Anxiety/depression: takes zoloft 100 mg/remeron 7.5 mg  Dementia: on Aricept 10mg    DNR. Spoke to the son and daughter and both agree that the patient should be DNR  DVT proph: scd    ----------------------------------------------    CC: f/u HTN    S: Tolerating full liquids but wants \"some real food\" referring to solid food  Was orthostatic when worked with physical therapy today  Still feels SOB  Still has nausea but no vomiting       Review of Systems:  Pertinent items are noted in HPI.     O:  Visit Vitals  /74 (BP Patient Position: Supine)   Pulse (!) 111   Temp 98.4 °F (36.9 °C)   Resp 22   Ht 5' 1\" (1.549 m)   Wt 49.4 kg (109 lb)   SpO2 96%   BMI 20.60 kg/m²       PHYSICAL EXAM:  Gen: NAD, non-toxic  HEENT: anicteric sclerae, normal conjunctiva  Neck: supple, trachea midline, no adenopathy  Heart: RRR, no MRG, no JVD, no peripheral edema  Lungs: CTA b/l anteriorly, non-labored respirations on O2  Abd: soft, surgical scars dressed, ND, BS+  Extr: warm  Skin: dry, no rash  Neuro: CN II-XII grossly intact, normal speech, moves all extremities  Psych: normal mood, appropriate affect    No intake or output data in the 24 hours ending 04/12/22 1321     Recent labs & imaging reviewed:  Recent Results (from the past 24 hour(s))   METABOLIC PANEL, BASIC    Collection Time: 04/12/22  8:59 AM   Result Value Ref Range    Sodium 135 (L) 136 - 145 mmol/L    Potassium 3.2 (L) 3.5 - 5.1 mmol/L    Chloride 102 97 - 108 mmol/L    CO2 26 21 - 32 mmol/L    Anion gap 7 5 - 15 mmol/L    Glucose 109 (H) 65 - 100 mg/dL    BUN 14 6 - 20 MG/DL    Creatinine 0.93 0.55 - 1.02 MG/DL    BUN/Creatinine ratio 15 12 - 20      GFR est AA >60 >60 ml/min/1.73m2    GFR est non-AA 57 (L) >60 ml/min/1.73m2    Calcium 9.8 8.5 - 10.1 MG/DL     Recent Labs     04/11/22  0614 04/10/22  0253   WBC 9.3 8.0   HGB 11.5 10.7*   HCT 34.0* 32.0*    193     Recent Labs     04/12/22  0859 04/11/22  0614 04/10/22  0253   * 137 140   K 3.2* 2.8* 2.8*    104 111*   CO2 26 24 17*   BUN 14 12 11   CREA 0.93 0.92 0.72   * 87 63*   CA 9.8 8.8 8.1*   MG  --   --  1.8     No results for input(s): ALT, AP, TBIL, TBILI, TP, ALB, GLOB, GGT, AML, LPSE in the last 72 hours. No lab exists for component: SGOT, GPT, AMYP, HLPSE  No results for input(s): INR, PTP, APTT, INREXT, INREXT in the last 72 hours. No results for input(s): FE, TIBC, PSAT, FERR in the last 72 hours. No results found for: FOL, RBCF   No results for input(s): PH, PCO2, PO2 in the last 72 hours. No results for input(s): CPK, CKNDX, TROIQ in the last 72 hours.     No lab exists for component: CPKMB  No results found for: CHOL, CHOLX, CHLST, CHOLV, HDL, HDLP, LDL, LDLC, DLDLP, TGLX, TRIGL, TRIGP, CHHD, [facility]  Lab Results   Component Value Date/Time    Glucose (POC) 69 04/09/2022 08:31 AM    Glucose (POC) 134 (H) 04/06/2022 09:14 PM     Lab Results   Component Value Date/Time    Color YELLOW/STRAW 04/05/2022 11:40 PM    Appearance CLEAR 04/05/2022 11:40 PM    Specific gravity >1.030 (H) 04/05/2022 11:40 PM    pH (UA) 5.5 04/05/2022 11:40 PM    Protein TRACE (A) 04/05/2022 11:40 PM    Glucose Negative 04/05/2022 11:40 PM    Ketone 15 (A) 04/05/2022 11:40 PM    Bilirubin Negative 04/05/2022 11:40 PM    Urobilinogen 0.2 04/05/2022 11:40 PM    Nitrites Negative 04/05/2022 11:40 PM    Leukocyte Esterase Negative 04/05/2022 11:40 PM    Epithelial cells FEW 04/05/2022 11:40 PM    Bacteria Negative 04/05/2022 11:40 PM    WBC 0-4 04/05/2022 11:40 PM    RBC 0-5 04/05/2022 11:40 PM       Med list reviewed  Current Facility-Administered Medications   Medication Dose Route Frequency    potassium chloride 10 mEq in 100 ml IVPB  10 mEq IntraVENous Q1H    losartan (COZAAR) tablet 25 mg  25 mg Oral DAILY    fluconazole (DIFLUCAN) tablet 200 mg  200 mg Oral DAILY    amoxicillin-clavulanate (AUGMENTIN) 875-125 mg per tablet 1 Tablet  1 Tablet Oral Q12H    labetaloL (NORMODYNE;TRANDATE) injection 20 mg  20 mg IntraVENous Q6H PRN    amLODIPine (NORVASC) tablet 10 mg  10 mg Oral DAILY    hydroCHLOROthiazide (HYDRODIURIL) tablet 25 mg  25 mg Oral DAILY    hydrALAZINE (APRESOLINE) tablet 50 mg  50 mg Oral Q6H PRN    albuterol-ipratropium (DUO-NEB) 2.5 MG-0.5 MG/3 ML  3 mL Nebulization Q4H PRN    simethicone (MYLICON) 21SP/9.9LT oral drops 20 mg  20 mg Oral QID PRN    LORazepam (ATIVAN) injection 1 mg  1 mg IntraVENous Q6H PRN    pantoprazole (PROTONIX) 40 mg in 0.9% sodium chloride 10 mL injection  40 mg IntraVENous Q12H    sodium chloride (NS) flush 5-40 mL  5-40 mL IntraVENous Q8H    sodium chloride (NS) flush 5-40 mL  5-40 mL IntraVENous PRN    acetaminophen (TYLENOL) tablet 650 mg  650 mg Oral Q6H PRN    Or    acetaminophen (TYLENOL) suppository 650 mg  650 mg Rectal Q6H PRN    polyethylene glycol (MIRALAX) packet 17 g  17 g Oral DAILY PRN    promethazine (PHENERGAN) tablet 12.5 mg  12.5 mg Oral Q6H PRN    Or    ondansetron (ZOFRAN) injection 4 mg  4 mg IntraVENous Q4H PRN    morphine injection 2-4 mg  2-4 mg IntraVENous Q2H PRN       Care Plan discussed with:  Patient/Family, Nurse and     Shayy Howell MD  Internal Medicine  Date of Service: 4/12/2022

## 2022-04-12 NOTE — PROGRESS NOTES
Bedside shift change report given to Willa Swanson RN (oncoming nurse) by Jessica Saldana RN (offgoing nurse). Report included the following information SBAR, Kardex, Intake/Output and MAR. X-ray of left humerus today due to complaint of pain and s/p fall. Drain dc'd and removed, patient tolerated well. Patient had potassium replacement therapy today.

## 2022-04-13 LAB
ANION GAP SERPL CALC-SCNC: 11 MMOL/L (ref 5–15)
BASOPHILS # BLD: 0 K/UL (ref 0–0.1)
BASOPHILS NFR BLD: 0 % (ref 0–1)
BUN SERPL-MCNC: 22 MG/DL (ref 6–20)
BUN/CREAT SERPL: 19 (ref 12–20)
CALCIUM SERPL-MCNC: 9.9 MG/DL (ref 8.5–10.1)
CHLORIDE SERPL-SCNC: 102 MMOL/L (ref 97–108)
CO2 SERPL-SCNC: 22 MMOL/L (ref 21–32)
CREAT SERPL-MCNC: 1.13 MG/DL (ref 0.55–1.02)
DIFFERENTIAL METHOD BLD: ABNORMAL
EOSINOPHIL # BLD: 0 K/UL (ref 0–0.4)
EOSINOPHIL NFR BLD: 0 % (ref 0–7)
ERYTHROCYTE [DISTWIDTH] IN BLOOD BY AUTOMATED COUNT: 14.4 % (ref 11.5–14.5)
GLUCOSE SERPL-MCNC: 96 MG/DL (ref 65–100)
HCT VFR BLD AUTO: 38.6 % (ref 35–47)
HGB BLD-MCNC: 12.6 G/DL (ref 11.5–16)
IMM GRANULOCYTES # BLD AUTO: 0 K/UL
IMM GRANULOCYTES NFR BLD AUTO: 0 %
LYMPHOCYTES # BLD: 1.3 K/UL (ref 0.8–3.5)
LYMPHOCYTES NFR BLD: 16 % (ref 12–49)
MCH RBC QN AUTO: 29.4 PG (ref 26–34)
MCHC RBC AUTO-ENTMCNC: 32.6 G/DL (ref 30–36.5)
MCV RBC AUTO: 90 FL (ref 80–99)
MONOCYTES # BLD: 0.3 K/UL (ref 0–1)
MONOCYTES NFR BLD: 4 % (ref 5–13)
NEUTS SEG # BLD: 6.4 K/UL (ref 1.8–8)
NEUTS SEG NFR BLD: 80 % (ref 32–75)
NRBC # BLD: 0 K/UL (ref 0–0.01)
NRBC BLD-RTO: 0 PER 100 WBC
PLATELET # BLD AUTO: 297 K/UL (ref 150–400)
PMV BLD AUTO: 10.1 FL (ref 8.9–12.9)
POTASSIUM SERPL-SCNC: 3.6 MMOL/L (ref 3.5–5.1)
RBC # BLD AUTO: 4.29 M/UL (ref 3.8–5.2)
RBC MORPH BLD: ABNORMAL
SODIUM SERPL-SCNC: 135 MMOL/L (ref 136–145)
WBC # BLD AUTO: 8 K/UL (ref 3.6–11)

## 2022-04-13 PROCEDURE — 74011250637 HC RX REV CODE- 250/637: Performed by: INTERNAL MEDICINE

## 2022-04-13 PROCEDURE — 36415 COLL VENOUS BLD VENIPUNCTURE: CPT

## 2022-04-13 PROCEDURE — 74011250637 HC RX REV CODE- 250/637: Performed by: SURGERY

## 2022-04-13 PROCEDURE — 85025 COMPLETE CBC W/AUTO DIFF WBC: CPT

## 2022-04-13 PROCEDURE — 97535 SELF CARE MNGMENT TRAINING: CPT

## 2022-04-13 PROCEDURE — 74011000250 HC RX REV CODE- 250: Performed by: SURGERY

## 2022-04-13 PROCEDURE — 97530 THERAPEUTIC ACTIVITIES: CPT

## 2022-04-13 PROCEDURE — 99024 POSTOP FOLLOW-UP VISIT: CPT | Performed by: SURGERY

## 2022-04-13 PROCEDURE — 74011250637 HC RX REV CODE- 250/637: Performed by: HOSPITALIST

## 2022-04-13 PROCEDURE — 80048 BASIC METABOLIC PNL TOTAL CA: CPT

## 2022-04-13 PROCEDURE — 65660000001 HC RM ICU INTERMED STEPDOWN

## 2022-04-13 RX ORDER — CARVEDILOL 3.12 MG/1
3.12 TABLET ORAL 2 TIMES DAILY WITH MEALS
Status: DISCONTINUED | OUTPATIENT
Start: 2022-04-13 | End: 2022-04-14 | Stop reason: HOSPADM

## 2022-04-13 RX ORDER — SERTRALINE HYDROCHLORIDE 50 MG/1
100 TABLET, FILM COATED ORAL DAILY
Status: DISCONTINUED | OUTPATIENT
Start: 2022-04-13 | End: 2022-04-14 | Stop reason: HOSPADM

## 2022-04-13 RX ORDER — PRAVASTATIN SODIUM 40 MG/1
40 TABLET ORAL
Status: DISCONTINUED | OUTPATIENT
Start: 2022-04-13 | End: 2022-04-14 | Stop reason: HOSPADM

## 2022-04-13 RX ORDER — AMOXICILLIN AND CLAVULANATE POTASSIUM 500; 125 MG/1; MG/1
1 TABLET, FILM COATED ORAL EVERY 12 HOURS
Status: DISCONTINUED | OUTPATIENT
Start: 2022-04-13 | End: 2022-04-14 | Stop reason: HOSPADM

## 2022-04-13 RX ADMIN — PANTOPRAZOLE SODIUM 40 MG: 40 TABLET, DELAYED RELEASE ORAL at 17:15

## 2022-04-13 RX ADMIN — CARVEDILOL 3.12 MG: 3.12 TABLET, FILM COATED ORAL at 17:15

## 2022-04-13 RX ADMIN — SERTRALINE 100 MG: 50 TABLET, FILM COATED ORAL at 17:15

## 2022-04-13 RX ADMIN — AMOXICILLIN AND CLAVULANATE POTASSIUM 1 TABLET: 875; 125 TABLET, FILM COATED ORAL at 08:56

## 2022-04-13 RX ADMIN — AMLODIPINE BESYLATE 2.5 MG: 5 TABLET ORAL at 08:56

## 2022-04-13 RX ADMIN — SODIUM CHLORIDE, PRESERVATIVE FREE 10 ML: 5 INJECTION INTRAVENOUS at 06:00

## 2022-04-13 RX ADMIN — PRAVASTATIN SODIUM 40 MG: 40 TABLET ORAL at 21:35

## 2022-04-13 RX ADMIN — HYDROCHLOROTHIAZIDE 25 MG: 25 TABLET ORAL at 08:56

## 2022-04-13 RX ADMIN — PANTOPRAZOLE SODIUM 40 MG: 40 TABLET, DELAYED RELEASE ORAL at 08:46

## 2022-04-13 RX ADMIN — AMOXICILLIN AND CLAVULANATE POTASSIUM 1 TABLET: 500; 125 TABLET, FILM COATED ORAL at 21:35

## 2022-04-13 RX ADMIN — SODIUM CHLORIDE, PRESERVATIVE FREE 10 ML: 5 INJECTION INTRAVENOUS at 21:35

## 2022-04-13 RX ADMIN — SODIUM CHLORIDE, PRESERVATIVE FREE 10 ML: 5 INJECTION INTRAVENOUS at 17:16

## 2022-04-13 RX ADMIN — FLUCONAZOLE 200 MG: 100 TABLET ORAL at 08:56

## 2022-04-13 RX ADMIN — LOSARTAN POTASSIUM 25 MG: 25 TABLET, FILM COATED ORAL at 08:57

## 2022-04-13 NOTE — PROGRESS NOTES
Problem: Mobility Impaired (Adult and Pediatric)  Goal: *Acute Goals and Plan of Care (Insert Text)  Description: FUNCTIONAL STATUS PRIOR TO ADMISSION: Patient is poor historian and no family bedside at time of evaluation. Per CM note: \"Per daughter, patient was independent with ADL's and mostly independent with IADL's. Patient was no longer cooking, but was able to complete light housekeeping and laundry. Patient was ambulating with the use of a cane and/or rollator depending on amount of assistance needed. Patient was receiving outpatient therapy at [facility] on [address] [facility] 2x/week for PT. \"    HOME SUPPORT PRIOR TO ADMISSION: The patient lived with daughter and required assist as needed with IADLs. Physical Therapy Goals  Initiated 4/7/2022  1. Patient will move from supine to sit and sit to supine  in bed with supervision/set-up while maintaining post-op precautions within 7 day(s). 2.  Patient will transfer from bed to chair and chair to bed with supervision/set-up using the least restrictive device within 7 day(s). 3.  Patient will perform sit to stand with supervision/set-up within 7 day(s). 4.  Patient will ambulate with minimal assistance/contact guard assist for 50 feet with the least restrictive device within 7 day(s). 5.  Patient will ascend/descend 4 stairs with unilateral handrail(s) with minimal assistance/contact guard assist within 7 day(s). Outcome: Progressing Towards Goal     PHYSICAL THERAPY TREATMENT  Patient: Christa Reyes (58 y.o. female)  Date: 4/13/2022  Diagnosis: Perforated gastric ulcer ([facility] [address] [K25.5] Perforated gastric ulcer ([facility] [address]  Procedure(s) (LRB):  LAPAROSCOPY GENERAL DIAGNOSTIC, perforated ulcer repair (N/A) 7 Days Post-Op  Precautions: Fall  Chart, physical therapy assessment, plan of care and goals were reviewed. ASSESSMENT  Patient continues with skilled PT services and is slowly progressing towards goals.  Patient continues to be limited by symptomatic orthostatic hypotension impacting ability to progress with physical therapy at this time. She is motivated to participate with therapy and has supportive family. Anticipate slow progress but anticipate patient will be limited in progress until symptomatic orthostatic hypotension addressed. Able to tolerate increased time edge of bed while working on ADLs this date but immediately became nauseous and became diaphoretic upon standing with posterior lean. Acute PT will continue to follow and progress as able. Recommend patient discharge to subacute rehab/SNF. Current Level of Function Impacting Discharge (mobility/balance): orthostatic hypotension, moderate assist standing with posterior lean and bilateral upper extremity support    Other factors to consider for discharge: dementia, supportive family         PLAN :  Patient continues to benefit from skilled intervention to address the above impairments. Continue treatment per established plan of care. to address goals. Recommendation for discharge: (in order for the patient to meet his/her long term goals)  Therapy up to 5 days/week in SNF setting    This discharge recommendation:  Has not yet been discussed the attending provider and/or case management    IF patient discharges home will need the following DME: to be determined (TBD)       SUBJECTIVE:   Patient stated I feel like I'm going to throw up.     OBJECTIVE DATA SUMMARY:   Critical Behavior:  Neurologic State: Alert,Confused  Orientation Level: Oriented to person,Disoriented to time,Disoriented to situation,Oriented to time  Cognition: Follows commands,Poor safety awareness  Safety/Judgement: Decreased awareness of environment,Decreased insight into deficits,Decreased awareness of need for safety  Functional Mobility Training:  Bed Mobility:  Supine to Sit: Moderate assistance; Additional time (majority of assist with LEs)  Sit to Supine: Maximum assistance;Assist x2 (assisted back to bed in setting of hypotension)  Transfers:  Sit to Stand: Moderate assistance (bilateral upper extremity support)  Stand to Sit: Moderate assistance (bilateral upper extremity support)  Balance:  Sitting: Impaired  Sitting - Static: Good (unsupported); Fair (occasional); Unsupported (increased assist with fatigue)  Sitting - Dynamic: Fair (occasional) (donning socks)  Standing: Impaired  Standing - Static: Constant support;Poor (posterior lean, bilateral upper extremity support)    Pain Rating:  Patient endorses some soreness at abdomen    Activity Tolerance:   Fair, Poor, requires rest breaks, and signs and symptoms of orthostatic hypotension   04/13/22 1131 04/13/22 1136 04/13/22 1150   Vital Signs   Heart Rate (Monitor)  --   --  120   BP (!) 143/65 (!) 112/58 (!) 88/68   MAP (Calculated) 91 76 75   BP Patient Position Supine Sitting Supine  (returned to bed following standing, symptomatic)      04/13/22 1154   Vital Signs   Heart Rate (Monitor) 99   BP (!) 165/86   MAP (Calculated) 112   BP Patient Position Supine     After treatment patient left in no apparent distress:   Supine in bed, Call bell within reach, Bed / chair alarm activated, and Caregiver / family present    COMMUNICATION/COLLABORATION:   The patients plan of care was discussed with: Occupational therapist and Registered nurse.   Discussed with RN regarding patient presenting with symptomatic orthostatic hypotension limiting ability to participate and progress with ongoing therapy     Lisa Romeo, PT   Time Calculation: 32 mins

## 2022-04-13 NOTE — PROGRESS NOTES
Cardiology Progress Note  2022     Admit Date: 2022  Admit Diagnosis: Perforated gastric ulcer ([facility] [K25.5]  CC: none currently    Assessment:   Principal Problem:    Perforated gastric ulcer ([facility] [address] (2022)      Plan:     Decreased amlodipine  Cont other cardiac meds  Would decrease losartan if needed for more BP control  Echo reviewed    Subjective:      Cathlyn Vargas resting comfortably    Objective:    Physical Exam:  Overall VSSAF;    Visit Vitals  BP (!) 165/86 (BP Patient Position: Supine)   Pulse (!) 109   Temp 97.5 °F (36.4 °C)   Resp 18   Ht 5' 1\" (1.549 m)   Wt 49.4 kg (109 lb)   SpO2 100%   BMI 20.60 kg/m²     Temp (24hrs), Av.8 °F (36.6 °C), Min:97.4 °F (36.3 °C), Max:98.8 °F (37.1 °C)    Patient Vitals for the past 8 hrs:   Pulse   22 1400 (!) 109   22 1200 (!) 103   22 1103 93   22 1044 (!) 115   22 1000 90   22 0847 75   22 0800 90    Patient Vitals for the past 8 hrs:   Resp   22 1103 18   22 0847 18    Patient Vitals for the past 8 hrs:   BP   22 1154 (!) 165/86   22 1150 (!) 88/68   22 1136 (!) 112/58   22 1131 (!) 143/65   22 1103 (!) 152/65   22 1044 120/67   22 0847 (!) 142/61      No intake/output data recorded. General Appearance: Well developed, well nourished, no acute distress. Ears/Nose/Mouth/Throat:   Normal MM; anicteric. JVP: WNL   Resp:   Lungs clear to auscultation bilaterally. Nl resp effort. Cardiovascular:  RRR, S1, S2 normal, no new murmur. No gallop or rub. Abdomen:   Soft, non-tender, bowel sounds are present. Extremities: No edema bilaterally. Skin:  Neuro: Warm and dry.   A/O x3, grossly nonfocal                         Data Review:     Telemetry independently reviewed :   normal sinus rhythm       ECG independently reviewed: NSR  Labs:   Recent Results (from the past 24 hour(s))   CBC WITH AUTOMATED DIFF    Collection Time: 22  3:47 AM Result Value Ref Range    WBC 8.0 3.6 - 11.0 K/uL    RBC 4.29 3.80 - 5.20 M/uL    HGB 12.6 11.5 - 16.0 g/dL    HCT 38.6 35.0 - 47.0 %    MCV 90.0 80.0 - 99.0 FL    MCH 29.4 26.0 - 34.0 PG    MCHC 32.6 30.0 - 36.5 g/dL    RDW 14.4 11.5 - 14.5 %    PLATELET 445 253 - 155 K/uL    MPV 10.1 8.9 - 12.9 FL    NRBC 0.0 0  WBC    ABSOLUTE NRBC 0.00 0.00 - 0.01 K/uL    NEUTROPHILS 80 (H) 32 - 75 %    LYMPHOCYTES 16 12 - 49 %    MONOCYTES 4 (L) 5 - 13 %    EOSINOPHILS 0 0 - 7 %    BASOPHILS 0 0 - 1 %    IMMATURE GRANULOCYTES 0 %    ABS. NEUTROPHILS 6.4 1.8 - 8.0 K/UL    ABS. LYMPHOCYTES 1.3 0.8 - 3.5 K/UL    ABS. MONOCYTES 0.3 0.0 - 1.0 K/UL    ABS. EOSINOPHILS 0.0 0.0 - 0.4 K/UL    ABS. BASOPHILS 0.0 0.0 - 0.1 K/UL    ABS. IMM.  GRANS. 0.0 K/UL    DF MANUAL      RBC COMMENTS NORMOCYTIC, NORMOCHROMIC     METABOLIC PANEL, BASIC    Collection Time: 04/13/22  3:47 AM   Result Value Ref Range    Sodium 135 (L) 136 - 145 mmol/L    Potassium 3.6 3.5 - 5.1 mmol/L    Chloride 102 97 - 108 mmol/L    CO2 22 21 - 32 mmol/L    Anion gap 11 5 - 15 mmol/L    Glucose 96 65 - 100 mg/dL    BUN 22 (H) 6 - 20 MG/DL    Creatinine 1.13 (H) 0.55 - 1.02 MG/DL    BUN/Creatinine ratio 19 12 - 20      GFR est AA 55 (L) >60 ml/min/1.73m2    GFR est non-AA 45 (L) >60 ml/min/1.73m2    Calcium 9.9 8.5 - 10.1 MG/DL      Current medications reviewed       Galen Wilson MD

## 2022-04-13 NOTE — PROGRESS NOTES
Renal Dosing/Monitoring  Medication: Augmentin   Current regimen:  875 mg PO every 12 hr  Recent Labs     04/13/22  0347 04/12/22  0859 04/11/22  0614   CREA 1.13* 0.93 0.92   BUN 22* 14 12     Estimated CrCl:  26 ml/min  Plan: Change to 500 mg PO every 12 hours per [facility] P&T Committee Protocol with respect to renal function. Pharmacy will continue to monitor patient daily and will make dosage adjustments based upon changing renal function.     Kye Lua, PharmD, BCPS

## 2022-04-13 NOTE — PROGRESS NOTES
Problem: Self Care Deficits Care Plan (Adult)  Goal: *Acute Goals and Plan of Care (Insert Text)  Description: FUNCTIONAL STATUS PRIOR TO ADMISSION: Spoke with pt's son who reported pt received assistance with ADLs and used RW or cane for functional mobility at home. HOME SUPPORT: The patient lived with daughter and required moderate assistance  for ADLs. Occupational Therapy Goals  Initiated 4/8/2022  1. Patient will perform self-feeding with supervision/set-up within 7 day(s). 2.  Patient will perform bathing from anterior neck to thigh with minimal assistance/contact guard assist within 7 day(s). 3.  Patient will perform upper body dressing with minimal assistance/contact guard assist within 7 day(s). 4.  Patient will perform toilet transfers with moderate assistance  within 7 day(s). 5.  Patient will perform all aspects of toileting with maximal assistance within 7 day(s). 6.  Patient will participate in upper extremity therapeutic exercise/activities with minimal assistance/contact guard assist for 10 minutes within 7 day(s). 7.  Patient will utilize energy conservation techniques during functional activities with verbal cues within 7 day(s). Outcome: Progressing Towards Goal   OCCUPATIONAL THERAPY TREATMENT  Patient: Drea Phelps (78 y.o. female)  Date: 4/13/2022  Diagnosis: Perforated gastric ulcer ([address] [K25.5] Perforated gastric ulcer ([address]  Procedure(s) (LRB):  LAPAROSCOPY GENERAL DIAGNOSTIC, perforated ulcer repair (N/A) 7 Days Post-Op  Precautions: Fall  Chart, occupational therapy assessment, plan of care, and goals were reviewed. ASSESSMENT  Patient continues with skilled OT services and is progressing towards goals. Patient received resting in bed, pleasant and agreeable to session. Patient demonstrates improved overall bed mobility, achieving transfer with overall mod assist with cues for best technique to minimize abdominal discomfort/pain.  Once seated EOB, patient reports feeling dizzy, however symptoms resolve after several minutes and patient is able to participate in donning B socks via tailor sitting as well as EOB grooming activities with CGA. Patient is agreeable to trial standing and sidestepping EOB, achieving with BUE support via therapist. Of note, however, patient reporting feeling nauseous and requiring return to seated position. Orthostatic BP as noted in vitals. Assisted patient back to supine and positioned for comfort. Discussed session with RN and concern for orthostatic hypotension limiting progress. Will continue to follow with SNF rehab recommended at discharge. 04/13/22 1131 04/13/22 1136 04/13/22 1150  1154    Vital Signs   Heart Rate (Monitor)  --   --  120 99   BP (!) 143/65 (!) 112/58 (!) 88/68 165/86   MAP (Calculated) 91 76 75 112   BP Patient Position Supine Sitting Supine  (returned to bed following standing, symptomatic) Supine       Current Level of Function Impacting Discharge (ADLs): set-up to mod A for self-care    Other factors to consider for discharge:          PLAN :  Patient continues to benefit from skilled intervention to address the above impairments. Continue treatment per established plan of care to address goals. Recommendation for discharge: (in order for the patient to meet his/her long term goals)  Therapy up to 5 days/week in SNF setting    This discharge recommendation:  Has been made in collaboration with the attending provider and/or case management    IF patient discharges home will need the following DME: TBD       SUBJECTIVE:   Patient stated I'm not sure I can this tired. But I will try.     OBJECTIVE DATA SUMMARY:   Cognitive/Behavioral Status:  Neurologic State: Alert;Confused  Orientation Level: Oriented to person;Oriented to place (oriented to city/state)  Cognition: Follows commands;Decreased attention/concentration  Perception: Appears intact  Perseveration: No perseveration noted  Safety/Judgement: Awareness of environment; Fall prevention    Functional Mobility and Transfers for ADLs:  Bed Mobility:  Supine to Sit: Moderate assistance; Additional time (majority of assist with LEs)  Sit to Supine: Maximum assistance;Assist x2 (assisted back to bed in setting of hypotension)    Transfers:  Sit to Stand: Moderate assistance (bilateral upper extremity support)   Stand to Sit: Moderate assistance     Balance:  Sitting: Impaired  Sitting - Static: Good (unsupported); Fair (occasional); Unsupported (increased assist with fatigue)  Sitting - Dynamic: Fair (occasional) (donning socks)  Standing: Impaired  Standing - Static: Constant support;Poor (posterior lean, bilateral upper extremity support)    ADL Intervention:  Feeding  Feeding Assistance: Set-up  Drink to Mouth: Stand-by assistance    Grooming  Grooming Assistance: Stand-by assistance  Position Performed: Seated edge of bed  Washing Face: Stand-by assistance  Brushing Teeth: Stand-by assistance  Cues: Verbal cues provided;Visual cues provided (min cues for initiation)    Lower Body Dressing Assistance  Dressing Assistance: Minimum assistance  Socks: Minimum assistance (to stabilize LE in tailor sitting)  Leg Crossed Method Used: Yes (w/ stabilization)  Position Performed: Seated edge of bed  Cues: Verbal cues provided;Visual cues provided    Cognitive Retraining  Safety/Judgement: Awareness of environment; Fall prevention    Pain:  Reports mild-moderate abdominal pain, improved with rest and repositioning. Activity Tolerance:   Fair    After treatment patient left in no apparent distress:   Supine in bed, Call bell within reach, Bed / chair alarm activated, Caregiver / family present and Side rails x 3    COMMUNICATION/COLLABORATION:   The patients plan of care was discussed with: Physical therapist and Registered nurse.      Daniela Landers OT  Time Calculation: 33 mins

## 2022-04-13 NOTE — PROGRESS NOTES
Hospitalist Progress Note      Hospital summary: 80 y.o lady w/ dementia, HTN, history of pacemaker placement, who is here for perforated gastric ulcer s/p open surgical repair. Assessment/Plan:  HTN:  -On norvasc/HCTZ, will switch to coreg    Pulmonary edema: due to acute diastolic/systolic CHF NYHA III  -IV diuresis held  -echo w/ severe hypertrophy and diastolic/systolic dysfunction. EF 30-40%  -Currently holding home metoprolol 25, spironolactone 25, lisinopril 5 mg, lasix 20 mg, pravastatin 40 mg qhs  -Per daughter, the patient had a stress test about 2 years ago  -Appreciate discussion with Cardiology, will stop norvasc/hctz. Will put the patient on coreg/statin. Continue losartan    Hypokalemia-replace as needed  Elevated serum troponin: not ACS    Perforated gastric ulcer s/p open repair 4/6:   -Appreciate surgery, continue full liquids for 1 more week. PPI BID. Augmentin/diflucan. Cleared from surgery standpoint for discharge. Outpatient follow up with Surgery in 1 week    Rheumatoid arthritis- at home on prednisone 5mg/Plaquenil 200 mg. Spoke to surgery, hold prednisone for 1 more week and then restart  Anxiety/depression: takes zoloft 100 mg/remeron 7.5 mg  Dementia: on Aricept 10mg    DNR. DVT proph: scd    Plan: Orthostatics tomorrow. Discharge to SNF when not orthostatic    ----------------------------------------------    CC: f/u HTN    S: Tolerating full liquids   Again was orthostatic when worked with physical therapy today  Nausea and vomiting when worked with physical therapy  Comfortably laying in bed         Review of Systems:  Pertinent items are noted in HPI.     O:  Visit Vitals  /72 (BP 1 Location: Left upper arm, BP Patient Position: At rest)   Pulse (!) 107   Temp 97.5 °F (36.4 °C)   Resp 22   Ht 5' 1\" (1.549 m)   Wt 49.4 kg (109 lb)   SpO2 95%   BMI 20.60 kg/m²       PHYSICAL EXAM:  Gen: NAD, non-toxic  HEENT: anicteric sclerae, normal conjunctiva  Neck: supple, trachea midline, no adenopathy  Heart: RRR, no MRG, no JVD, no peripheral edema  Lungs: CTA b/l anteriorly, non-labored respirations on O2  Abd: soft, surgical scars dressed, ND, BS+  Extr: warm  Skin: dry, no rash  Neuro: CN II-XII grossly intact, normal speech, moves all extremities  Psych: normal mood, appropriate affect    No intake or output data in the 24 hours ending 04/13/22 1534     Recent labs & imaging reviewed:  Recent Results (from the past 24 hour(s))   CBC WITH AUTOMATED DIFF    Collection Time: 04/13/22  3:47 AM   Result Value Ref Range    WBC 8.0 3.6 - 11.0 K/uL    RBC 4.29 3.80 - 5.20 M/uL    HGB 12.6 11.5 - 16.0 g/dL    HCT 38.6 35.0 - 47.0 %    MCV 90.0 80.0 - 99.0 FL    MCH 29.4 26.0 - 34.0 PG    MCHC 32.6 30.0 - 36.5 g/dL    RDW 14.4 11.5 - 14.5 %    PLATELET 760 015 - 696 K/uL    MPV 10.1 8.9 - 12.9 FL    NRBC 0.0 0  WBC    ABSOLUTE NRBC 0.00 0.00 - 0.01 K/uL    NEUTROPHILS 80 (H) 32 - 75 %    LYMPHOCYTES 16 12 - 49 %    MONOCYTES 4 (L) 5 - 13 %    EOSINOPHILS 0 0 - 7 %    BASOPHILS 0 0 - 1 %    IMMATURE GRANULOCYTES 0 %    ABS. NEUTROPHILS 6.4 1.8 - 8.0 K/UL    ABS. LYMPHOCYTES 1.3 0.8 - 3.5 K/UL    ABS. MONOCYTES 0.3 0.0 - 1.0 K/UL    ABS. EOSINOPHILS 0.0 0.0 - 0.4 K/UL    ABS. BASOPHILS 0.0 0.0 - 0.1 K/UL    ABS. IMM.  GRANS. 0.0 K/UL    DF MANUAL      RBC COMMENTS NORMOCYTIC, NORMOCHROMIC     METABOLIC PANEL, BASIC    Collection Time: 04/13/22  3:47 AM   Result Value Ref Range    Sodium 135 (L) 136 - 145 mmol/L    Potassium 3.6 3.5 - 5.1 mmol/L    Chloride 102 97 - 108 mmol/L    CO2 22 21 - 32 mmol/L    Anion gap 11 5 - 15 mmol/L    Glucose 96 65 - 100 mg/dL    BUN 22 (H) 6 - 20 MG/DL    Creatinine 1.13 (H) 0.55 - 1.02 MG/DL    BUN/Creatinine ratio 19 12 - 20      GFR est AA 55 (L) >60 ml/min/1.73m2    GFR est non-AA 45 (L) >60 ml/min/1.73m2    Calcium 9.9 8.5 - 10.1 MG/DL     Recent Labs     04/13/22  0347 04/11/22  0614   WBC 8.0 9.3   HGB 12.6 11.5   HCT 38.6 34.0*    231     Recent Labs     04/13/22  0347 04/12/22  0859 04/11/22  0614   * 135* 137   K 3.6 3.2* 2.8*    102 104   CO2 22 26 24   BUN 22* 14 12   CREA 1.13* 0.93 0.92   GLU 96 109* 87   CA 9.9 9.8 8.8     No results for input(s): ALT, AP, TBIL, TBILI, TP, ALB, GLOB, GGT, AML, LPSE in the last 72 hours. No lab exists for component: SGOT, GPT, AMYP, HLPSE  No results for input(s): INR, PTP, APTT, INREXT, INREXT in the last 72 hours. No results for input(s): FE, TIBC, PSAT, FERR in the last 72 hours. No results found for: FOL, RBCF   No results for input(s): PH, PCO2, PO2 in the last 72 hours. No results for input(s): CPK, CKNDX, TROIQ in the last 72 hours.     No lab exists for component: CPKMB  No results found for: CHOL, CHOLX, CHLST, CHOLV, HDL, HDLP, LDL, LDLC, DLDLP, TGLX, TRIGL, TRIGP, CHHD, CHHDX  Lab Results   Component Value Date/Time    Glucose (POC) 69 04/09/2022 08:31 AM    Glucose (POC) 134 (H) 04/06/2022 09:14 PM     Lab Results   Component Value Date/Time    Color YELLOW/STRAW 04/05/2022 11:40 PM    Appearance CLEAR 04/05/2022 11:40 PM    Specific gravity >1.030 (H) 04/05/2022 11:40 PM    pH (UA) 5.5 04/05/2022 11:40 PM    Protein TRACE (A) 04/05/2022 11:40 PM    Glucose Negative 04/05/2022 11:40 PM    Ketone 15 (A) 04/05/2022 11:40 PM    Bilirubin Negative 04/05/2022 11:40 PM    Urobilinogen 0.2 04/05/2022 11:40 PM    Nitrites Negative 04/05/2022 11:40 PM    Leukocyte Esterase Negative 04/05/2022 11:40 PM    Epithelial cells FEW 04/05/2022 11:40 PM    Bacteria Negative 04/05/2022 11:40 PM    WBC 0-4 04/05/2022 11:40 PM    RBC 0-5 04/05/2022 11:40 PM       Med list reviewed  Current Facility-Administered Medications   Medication Dose Route Frequency    amoxicillin-clavulanate (AUGMENTIN) 500-125 mg per tablet 1 Tablet  1 Tablet Oral Q12H    pantoprazole (PROTONIX) tablet 40 mg  40 mg Oral ACB&D    amLODIPine (NORVASC) tablet 2.5 mg  2.5 mg Oral DAILY    losartan (COZAAR) tablet 25 mg  25 mg Oral DAILY    fluconazole (DIFLUCAN) tablet 200 mg  200 mg Oral DAILY    labetaloL (NORMODYNE;TRANDATE) injection 20 mg  20 mg IntraVENous Q6H PRN    hydroCHLOROthiazide (HYDRODIURIL) tablet 25 mg  25 mg Oral DAILY    hydrALAZINE (APRESOLINE) tablet 50 mg  50 mg Oral Q6H PRN    albuterol-ipratropium (DUO-NEB) 2.5 MG-0.5 MG/3 ML  3 mL Nebulization Q4H PRN    simethicone (MYLICON) 44CL/9.3DS oral drops 20 mg  20 mg Oral QID PRN    LORazepam (ATIVAN) injection 1 mg  1 mg IntraVENous Q6H PRN    sodium chloride (NS) flush 5-40 mL  5-40 mL IntraVENous Q8H    sodium chloride (NS) flush 5-40 mL  5-40 mL IntraVENous PRN    acetaminophen (TYLENOL) tablet 650 mg  650 mg Oral Q6H PRN    Or    acetaminophen (TYLENOL) suppository 650 mg  650 mg Rectal Q6H PRN    polyethylene glycol (MIRALAX) packet 17 g  17 g Oral DAILY PRN    promethazine (PHENERGAN) tablet 12.5 mg  12.5 mg Oral Q6H PRN    Or    ondansetron (ZOFRAN) injection 4 mg  4 mg IntraVENous Q4H PRN    morphine injection 2-4 mg  2-4 mg IntraVENous Q2H PRN       Care Plan discussed with:  Patient/Family, Nurse and     Loa Sacks, MD  Internal Medicine  Date of Service: 4/13/2022

## 2022-04-13 NOTE — PROGRESS NOTES
Surgery Progress Note    4/13/2022    Admit Date: 4/5/2022    CC: Abdominal pain    POD: 7 open repair of perforated gastric ulcer    Subjective:     No acute issues. Baseline dementia    Constitutional: No fever or chills  Neurologic: No headache  Eyes: No scleral icterus or irritated eyes  Nose: No nasal pain or drainage  Mouth: No oral lesions or sore throat  Cardiac: No palpations or chest pain  Pulmonary: No cough or shortness of breath  Gastrointestinal: No abd pain, nausea, emesis, diarrhea, or constipation  Genitourinary: No dysuria  Musculoskeletal: Left arm pain improved  Skin: No rashes or lesions  Psychiatric: No anxiety or depressed mood    Objective:     Visit Vitals  /87 (BP 1 Location: Left upper arm, BP Patient Position: At rest)   Pulse 88   Temp 97.4 °F (36.3 °C)   Resp 20   Ht 5' 1\" (1.549 m)   Wt 109 lb (49.4 kg)   SpO2 100%   BMI 20.60 kg/m²       General: No acute distress, conversant  Eyes: PERRLA, no scleral icterus  HENT: Normocephalic without oral lesions  Neck: Trachea midline without LAD  Cardiac: Normal pulse rate and rhythm  Pulmonary: Symmetric chest rise with normal effort  GI: Soft, ATTP, wound cdi  Skin: Warm without rash  Extremities: Left upper arm tenderness  Psych: Appropriate mood and affect    Labs, vital signs, and I/O reviewed. Assessment:     66-year-old female doing fair after open repair of perforated gastric ulcer    Plan:     Continue full liquid diet for 1 more week. Okay to discharge today to SNF with Augmentin and Diflucan along with twice daily PPI. Hold steroids for another week. We can see her back next week for staple removal.    Tylenol should be okay for pain control for her.     Arturo Saini MD  Bariatric and General Surgeon  [facility]

## 2022-04-14 VITALS
TEMPERATURE: 98.4 F | BODY MASS INDEX: 20.58 KG/M2 | SYSTOLIC BLOOD PRESSURE: 132 MMHG | DIASTOLIC BLOOD PRESSURE: 55 MMHG | HEIGHT: 61 IN | HEART RATE: 76 BPM | RESPIRATION RATE: 19 BRPM | WEIGHT: 109 LBS | OXYGEN SATURATION: 97 %

## 2022-04-14 LAB
ALBUMIN SERPL-MCNC: 2.4 G/DL (ref 3.5–5)
ALBUMIN/GLOB SERPL: 0.5 {RATIO} (ref 1.1–2.2)
ALP SERPL-CCNC: 66 U/L (ref 45–117)
ALT SERPL-CCNC: 30 U/L (ref 12–78)
ANION GAP SERPL CALC-SCNC: 8 MMOL/L (ref 5–15)
AST SERPL-CCNC: 36 U/L (ref 15–37)
BASOPHILS # BLD: 0.1 K/UL (ref 0–0.1)
BASOPHILS NFR BLD: 1 % (ref 0–1)
BILIRUB SERPL-MCNC: 0.4 MG/DL (ref 0.2–1)
BUN SERPL-MCNC: 30 MG/DL (ref 6–20)
BUN/CREAT SERPL: 27 (ref 12–20)
CALCIUM SERPL-MCNC: 9.6 MG/DL (ref 8.5–10.1)
CHLORIDE SERPL-SCNC: 105 MMOL/L (ref 97–108)
CO2 SERPL-SCNC: 17 MMOL/L (ref 21–32)
CREAT SERPL-MCNC: 1.1 MG/DL (ref 0.55–1.02)
DIFFERENTIAL METHOD BLD: NORMAL
EOSINOPHIL # BLD: 0.1 K/UL (ref 0–0.4)
EOSINOPHIL NFR BLD: 2 % (ref 0–7)
ERYTHROCYTE [DISTWIDTH] IN BLOOD BY AUTOMATED COUNT: 14.4 % (ref 11.5–14.5)
GLOBULIN SER CALC-MCNC: 5.1 G/DL (ref 2–4)
GLUCOSE SERPL-MCNC: 107 MG/DL (ref 65–100)
HCT VFR BLD AUTO: 40.6 % (ref 35–47)
HGB BLD-MCNC: 13 G/DL (ref 11.5–16)
IMM GRANULOCYTES # BLD AUTO: 0 K/UL
IMM GRANULOCYTES NFR BLD AUTO: 0 %
LYMPHOCYTES # BLD: 1.4 K/UL (ref 0.8–3.5)
LYMPHOCYTES NFR BLD: 20 % (ref 12–49)
MCH RBC QN AUTO: 30 PG (ref 26–34)
MCHC RBC AUTO-ENTMCNC: 32 G/DL (ref 30–36.5)
MCV RBC AUTO: 93.5 FL (ref 80–99)
MONOCYTES # BLD: 0.4 K/UL (ref 0–1)
MONOCYTES NFR BLD: 6 % (ref 5–13)
NEUTS BAND NFR BLD MANUAL: 3 % (ref 0–6)
NEUTS SEG # BLD: 5.1 K/UL (ref 1.8–8)
NEUTS SEG NFR BLD: 68 % (ref 32–75)
NRBC # BLD: 0 K/UL (ref 0–0.01)
NRBC BLD-RTO: 0 PER 100 WBC
PLATELET # BLD AUTO: 269 K/UL (ref 150–400)
PMV BLD AUTO: 10.3 FL (ref 8.9–12.9)
POTASSIUM SERPL-SCNC: 4 MMOL/L (ref 3.5–5.1)
PROT SERPL-MCNC: 7.5 G/DL (ref 6.4–8.2)
RBC # BLD AUTO: 4.34 M/UL (ref 3.8–5.2)
RBC MORPH BLD: NORMAL
SODIUM SERPL-SCNC: 130 MMOL/L (ref 136–145)
WBC # BLD AUTO: 7.1 K/UL (ref 3.6–11)
WBC MORPH BLD: NORMAL

## 2022-04-14 PROCEDURE — 74011250637 HC RX REV CODE- 250/637: Performed by: SURGERY

## 2022-04-14 PROCEDURE — 74011250636 HC RX REV CODE- 250/636: Performed by: SURGERY

## 2022-04-14 PROCEDURE — 74011250637 HC RX REV CODE- 250/637: Performed by: INTERNAL MEDICINE

## 2022-04-14 PROCEDURE — 85025 COMPLETE CBC W/AUTO DIFF WBC: CPT

## 2022-04-14 PROCEDURE — 36415 COLL VENOUS BLD VENIPUNCTURE: CPT

## 2022-04-14 PROCEDURE — 97530 THERAPEUTIC ACTIVITIES: CPT

## 2022-04-14 PROCEDURE — 80053 COMPREHEN METABOLIC PANEL: CPT

## 2022-04-14 PROCEDURE — 74011000250 HC RX REV CODE- 250: Performed by: SURGERY

## 2022-04-14 PROCEDURE — 74011250637 HC RX REV CODE- 250/637: Performed by: HOSPITALIST

## 2022-04-14 RX ORDER — CARVEDILOL 3.12 MG/1
3.12 TABLET ORAL 2 TIMES DAILY WITH MEALS
Qty: 60 TABLET | Refills: 0 | Status: SHIPPED
Start: 2022-04-14

## 2022-04-14 RX ORDER — HYDROXYCHLOROQUINE SULFATE 200 MG/1
200 TABLET, FILM COATED ORAL DAILY
Qty: 30 TABLET | Refills: 0 | Status: SHIPPED
Start: 2022-04-14

## 2022-04-14 RX ORDER — LOSARTAN POTASSIUM 25 MG/1
25 TABLET ORAL DAILY
Qty: 30 TABLET | Refills: 0 | Status: SHIPPED
Start: 2022-04-15

## 2022-04-14 RX ORDER — PRAVASTATIN SODIUM 40 MG/1
40 TABLET ORAL
Qty: 30 TABLET | Refills: 0 | Status: SHIPPED
Start: 2022-04-14

## 2022-04-14 RX ADMIN — LOSARTAN POTASSIUM 25 MG: 25 TABLET, FILM COATED ORAL at 08:38

## 2022-04-14 RX ADMIN — PANTOPRAZOLE SODIUM 40 MG: 40 TABLET, DELAYED RELEASE ORAL at 07:14

## 2022-04-14 RX ADMIN — SERTRALINE 100 MG: 50 TABLET, FILM COATED ORAL at 08:38

## 2022-04-14 RX ADMIN — ONDANSETRON HYDROCHLORIDE 4 MG: 2 SOLUTION INTRAMUSCULAR; INTRAVENOUS at 10:26

## 2022-04-14 RX ADMIN — CARVEDILOL 3.12 MG: 3.12 TABLET, FILM COATED ORAL at 08:39

## 2022-04-14 RX ADMIN — SODIUM CHLORIDE, PRESERVATIVE FREE 10 ML: 5 INJECTION INTRAVENOUS at 07:14

## 2022-04-14 RX ADMIN — AMOXICILLIN AND CLAVULANATE POTASSIUM 1 TABLET: 500; 125 TABLET, FILM COATED ORAL at 08:38

## 2022-04-14 RX ADMIN — FLUCONAZOLE 200 MG: 100 TABLET ORAL at 08:38

## 2022-04-14 NOTE — PROGRESS NOTES
Transition of Care    RUR: 12%    10:24am  Noe called back from [facility]. They have a bed for Ms. Steven Chirinos. They will need a COVID test within 48 hours of discharge. Will continue to follow for discharge planning. [facility] ([phone]) was called. A message was left to see if Ms. Steven Chirinos continues to have a bed there.   Will continue to follow for discharge planning  Signed By: Henry Manning LCSW     April 14, 2022

## 2022-04-14 NOTE — PROGRESS NOTES
Problem: Mobility Impaired (Adult and Pediatric)  Goal: *Acute Goals and Plan of Care (Insert Text)  Description: FUNCTIONAL STATUS PRIOR TO ADMISSION: Patient is poor historian and no family bedside at time of evaluation. Per CM note: \"Per daughter, patient was independent with ADL's and mostly independent with IADL's. Patient was no longer cooking, but was able to complete light housekeeping and laundry. Patient was ambulating with the use of a cane and/or rollator depending on amount of assistance needed. Patient was receiving outpatient therapy at [facility] on [address] [facility] 2x/week for PT. \"    HOME SUPPORT PRIOR TO ADMISSION: The patient lived with daughter and required assist as needed with IADLs. Physical Therapy Goals  Revisited 4/14/2022, goals not met but remain appropriate so carry over      Physical Therapy Goals  Initiated 4/7/2022  1. Patient will move from supine to sit and sit to supine  in bed with supervision/set-up while maintaining post-op precautions within 7 day(s). 2.  Patient will transfer from bed to chair and chair to bed with supervision/set-up using the least restrictive device within 7 day(s). 3.  Patient will perform sit to stand with supervision/set-up within 7 day(s). 4.  Patient will ambulate with minimal assistance/contact guard assist for 50 feet with the least restrictive device within 7 day(s). 5.  Patient will ascend/descend 4 stairs with unilateral handrail(s) with minimal assistance/contact guard assist within 7 day(s). Outcome: Progressing Towards Goal   PHYSICAL THERAPY TREATMENT: WEEKLY REASSESSMENT  Patient: Johan Robb (70 y.o. female)  Date: 4/14/2022  Primary Diagnosis: Perforated gastric ulcer ([facility] [address] [K25.5]  Procedure(s) (LRB):  LAPAROSCOPY GENERAL DIAGNOSTIC, perforated ulcer repair (N/A) 8 Days Post-Op   Precautions:   Aspiration,Bed Alarm,Fall, orthostatic hypotension.       ASSESSMENT  Patient continues with skilled PT services and is not progressing towards goals. PT continues to be limited by likely orthostatic hypotension, see chart below. Working with her while she was sitting at the EOB doing ankle pumps try to manage dropping BP but then she reported feeling more symptomatic and looking worse so returned her to the bed, in sidelying position given report of feeling nauseated. BP improved in sidelying. Propped her up to remain in sidelying and updated her nurse. Pt also is limited by confusion but was fully able to follow commands and participate in session (was pleasantly confused). Pt has been limited by orthostatic hypotension (not just today) and unable to progress with mobility, hopefully once this is managed she can make gains with mobility. Vitals:       04/14/22 0849 04/14/22 0857 04/14/22 0900   BP:   (!) 141/69 123/60 132/64   BP 1 Location:   Left upper arm Left upper arm Left upper arm   BP Patient Position:   Supine Sitting  Comment: Side of Bed Lying right side   Pulse:   85 92 87   Temp:           Resp:   23 15 23   Height:           Weight:           SpO2:on room air   97% 95% 97%                      Patient's progression toward goals since last assessment: gains not made, goals remain appropriate so carry over. Current Level of Function Impacting Discharge (mobility/balance): min to mod assist at bed level    Functional Outcome Measure: The patient scored 0/28 on the Tinetti outcome measure which is indicative of high fall risk. Other factors to consider for discharge: high fall risk, altered mental status, stairs at home, aspiration precautions, and dementia. PLAN :  Goals have been updated based on progression since last assessment. Patient continues to benefit from skilled intervention to address the above impairments.     Recommendations and Planned Interventions: bed mobility training, transfer training, gait training, therapeutic exercises, patient and family training/education, and therapeutic activities      Frequency/Duration: Patient will be followed by physical therapy:  5 times a week to address goals. Recommendation for discharge: (in order for the patient to meet his/her long term goals)  Therapy up to 5 days/week in SNF setting    This discharge recommendation:  A follow-up discussion with the attending provider and/or case management is planned    IF patient discharges home will need the following DME: to be determined (TBD), pending progress         SUBJECTIVE:   Patient stated I'm dizzy, I feel like I'm going to throw up.     OBJECTIVE DATA SUMMARY:   Chart checked, pt cleared by sigrid  HISTORY:    Past Medical History:   Diagnosis Date    Arthritis     Dementia ([facility] [address]     Depression     Essential hypertension     Headache     High cholesterol     Hypertension     Pacemaker      Past Surgical History:   Procedure Laterality Date    HX GI      EGD/colonoscopy    HX GYN      hysterectomy       Personal factors and/or comorbidities impacting plan of care: high fall risk, altered mental status, stairs at home, aspiration precautions, and dementia. Home Situation  Home Environment: Private residence  # Steps to Enter: 4  One/Two Story Residence: One story  Support Systems: Child(vero)  Patient Expects to be Discharged to[de-identified] Skilled nursing facility  Current DME Used/Available at Home: Pinkie Mower, straight,Walker, rollator    EXAMINATION/PRESENTATION/DECISION MAKING:   Critical Behavior:  Neurologic State: Alert,Confused  Orientation Level: Oriented to person,Disoriented to time,Disoriented to place (partially oriented to situation)  Cognition: Follows commands  Safety/Judgement: Awareness of environment,Fall prevention  Hearing: Auditory  Auditory Impairment: Hearing aid(s)  Hearing Aids/Status:  At home  Skin:  refer to MD and nursing notes  Edema: none noted  Range Of Motion:  AROM: Generally decreased, functional                       Strength:    Strength: Generally decreased, functional                    Tone & Sensation:                  Sensation: Intact               Coordination:     Vision:      Functional Mobility:  Bed Mobility:     Supine to Sit: Minimum assistance;Assist x1  Sit to Supine: Moderate assistance;Assist x1 (to sidelying)     Transfers:                             Balance:   Sitting: Impaired  Sitting - Static: Good (unsupported)  Sitting - Dynamic: Fair (occasional)  Ambulation/Gait Training:                                                         Stairs: Therapeutic Exercises: Ankle pumps while seated at the EOB    Functional Measure:  Tinetti test:    Sitting Balance: 0  Arises: 0  Attempts to Rise: 0  Immediate Standing Balance: 0  Standing Balance: 0  Nudged: 0  Eyes Closed: 0  Turn 360 Degrees - Continuous/Discontinuous: 0  Turn 360 Degrees - Steady/Unsteady: 0  Sitting Down: 0  Balance Score: 0 Balance total score  Indication of Gait: 0  R Step Length/Height: 0  L Step Length/Height: 0  R Foot Clearance: 0  L Foot Clearance: 0  Step Symmetry: 0  Step Continuity: 0  Path: 0  Trunk: 0  Walking Time: 0  Gait Score: 0 Gait total score  Total Score: 0/28 Overall total score         Tinetti Tool Score Risk of Falls  <19 = High Fall Risk  19-24 = Moderate Fall Risk  25-28 = Low Fall Risk  Tinetti ME. Performance-Oriented Assessment of Mobility Problems in Elderly Patients. [facility]; [id number].  (Scoring Description: PT Bulletin Feb. 10, 1993)    Older adults: Ascencion Mitchell et al, 2009; n = 1000 [facility] elderly evaluated with ABC, RICH, ADL, and IADL)  · Mean RICH score for males aged 69-68 years = 26.21(3.40)  · Mean RICH score for females age 69-68 years = 25.16(4.30)  · Mean RICH score for males over 80 years = 23.29(6.02)  · Mean RICH score for females over 80 years = 17.20(8.32)          Pain Rating:  None rated    Activity Tolerance:   signs and symptoms of orthostatic hypotension    After treatment patient left in no apparent distress: Patient positioned in right sidelying for pressure relief, Call bell within reach, Bed / chair alarm activated, and Side rails x 3    COMMUNICATION/EDUCATION:   The patients plan of care was discussed with: Occupational therapist and Registered nurse. Fall prevention education was provided and the patient/caregiver indicated understanding., Patient/family have participated as able in goal setting and plan of care. , and Patient/family agree to work toward stated goals and plan of care.     Thank you for this referral.  Norma Montejo   Time Calculation: 25 mins

## 2022-04-14 NOTE — PROGRESS NOTES
Bedside shift change report given to [address] [place] [address] (oncoming nurse) by Katy Ross (offgoing nurse). Report included the following information SBAR, OR Summary, Procedure Summary, Intake/Output, MAR, Recent Results and Med Rec Status.

## 2022-04-14 NOTE — PROGRESS NOTES
Bedside shift change report given to KAM Freedman (oncoming nurse) by Harris Zelaya RN (offgoing nurse). Report included the following information SBAR, Kardex, ED Summary, Intake/Output, MAR, Accordion, Recent Results, Med Rec Status, Cardiac Rhythm biventricular paced  and Alarm Parameters .

## 2022-04-14 NOTE — PROGRESS NOTES
Full note to follow.  Xuan Hunter, PT      Vitals:     04/14/22 0849 04/14/22 0857 04/14/22 0900   BP:  (!) 141/69 123/60 132/64   BP 1 Location:  Left upper arm Left upper arm Left upper arm   BP Patient Position:  Supine Sitting  Comment: Side of Bed Lying right side   Pulse:  85 92 87   Temp:       Resp:  23 15 23   Height:       Weight:       SpO2:on room air  97% 95% 97%

## 2022-04-14 NOTE — DISCHARGE SUMMARY
Discharge Summary     Patient:  Lashaun Jaimes       MRN: [id number]       YOB: 1934       Age: 80 y.o. Date of admission:  4/5/2022    Date of discharge:  4/14/2022    Primary care provider: Dr. Shania Matute DO    Admitting provider:  Arturo Saini MD    Discharging provider:  Viktor Gregory MD - [phone]  If unavailable, call [phone] and ask the  to page the triage hospitalist.    Consultations  · IP CONSULT TO HOSPITALIST  · IP CONSULT TO CARDIOLOGY  · IP CONSULT TO CARDIOLOGY    Procedures  · Procedure(s):  · LAPAROSCOPY GENERAL DIAGNOSTIC, perforated ulcer repair    Discharge destination: SNF. The patient is stable for discharge. Admission diagnosis  · Perforated gastric ulcer ([address] [K25.5]    Current Discharge Medication List      START taking these medications    Details   carvediloL (COREG) 3.125 mg tablet Take 1 Tablet by mouth two (2) times daily (with meals). Qty: 60 Tablet, Refills: 0  Start date: 4/14/2022      pravastatin (PRAVACHOL) 40 mg tablet Take 1 Tablet by mouth nightly. Qty: 30 Tablet, Refills: 0  Start date: 4/14/2022      losartan (COZAAR) 25 mg tablet Take 1 Tablet by mouth daily. Qty: 30 Tablet, Refills: 0  Start date: 4/15/2022      hydrOXYchloroQUINE (PlaqueniL) 200 mg tablet Take 1 Tablet by mouth daily. Qty: 30 Tablet, Refills: 0  Start date: 4/14/2022      amoxicillin-clavulanate (AUGMENTIN) 875-125 mg per tablet Take 1 Tablet by mouth every twelve (12) hours for 7 days. Qty: 14 Tablet, Refills: 0  Start date: 4/12/2022, End date: 4/19/2022      fluconazole (DIFLUCAN) 200 mg tablet Take 1 Tablet by mouth daily for 7 days. FDA advises cautious prescribing of oral fluconazole in pregnancy. Qty: 7 Tablet, Refills: 0  Start date: 4/13/2022, End date: 4/20/2022      omeprazole (PRILOSEC) 40 mg capsule Take 1 Capsule by mouth two (2) times a day for 14 days. Open capsule over food and consume the power. DO NOT EAT CAPSULE INTACT. Qty: 28 Capsule, Refills: 0  Start date: 4/12/2022, End date: 4/26/2022         CONTINUE these medications which have NOT CHANGED    Details   ondansetron hcl (ZOFRAN) 4 mg tablet Take 4 mg by mouth every eight (8) hours as needed for Nausea. sertraline (ZOLOFT) 100 mg tablet Take  by mouth daily. leflunomide (ARAVA) 20 mg tablet Take 20 mg by mouth daily. denosumab (PROLIA) 60 mg/mL injection 60 mg by SubCUTAneous route. CALCIUM CARBONATE/VITAMIN D3 (CALCIUM 600 + D,3, PO) Take 600 mg by mouth. Takes 2 per day       MULTIVITAMINS W-MINERALS/LUT (CENTRUM SILVER PO) Take  by mouth daily. OTHER Osteo bi-flex. Takes two po once a day. STOP taking these medications       amLODIPine (NORVASC) 10 mg tablet Comments:   Reason for Stopping:         amLODIPine (NORVASC) 5 mg tablet Comments:   Reason for Stopping: Follow-up Information     Follow up With Specialties Details Why [address] [facility]    [address]    Taryn Chou MD General Surgery, Bariatrics In 1 week for staple removal  [address] [place] [address] [address] [facility]      Ramesh Brasher, [address] [place] [address]  [phone]            Final discharge diagnoses and brief hospital course  Please also refer to the admission H&P for details on the presenting problem. 80 y.o lady w/ dementia, HTN, history of pacemaker placement, who is here for perforated gastric ulcer s/p open surgical repair. HTN:  -On norvasc/HCTZ, will switch to coreg and cozaar  - orthostatic vitals improved      Pulmonary edema: due to acute diastolic/systolic CHF NYHA III  -IV diuresis held  -echo w/ severe hypertrophy and diastolic/systolic dysfunction.  EF 30-40%  - holding home meds  -Per daughter, the patient had a stress test about 2 years ago  -Appreciate discussion with Cardiology, will stop norvasc/hctz. Will put the patient on coreg/statin. Continue losartan  - monitor BP     Hypokalemia-resolved      Perforated gastric ulcer s/p open repair 4/6:   -Appreciate surgery, continue full liquids for 1 more week. PPI BID. Augmentin/diflucan. Cleared from surgery standpoint for discharge. Outpatient follow up with Surgery in 1 week     Rheumatoid arthritis- Plaquenil 200 mg. Spoke to surgery, hold prednisone for 1 more week and then restart Prednisone 5mg daily   Anxiety/depression: takes zoloft 100 mg/remeron 7.5 mg  Dementia: on Aricept 10mg     DNR. FOLLOW-UP CARE RECOMMENDATIONS:    APPOINTMENTS:    FOLLOW-UP TESTS RECOMMENDED:     - FULL Liquid diet for 1 week   - surgical follow up in 1 week for staple removal  - resume prednisone 5mg daily after 1 week    ONGOING TREATMENT PLAN: as above    PENDING TEST RESULTS:  At the time of discharge the following test results are still pending: NONE  Please review these results as they become available. Specific symptoms to watch for: chest pain, shortness of breath, fever, chills, nausea, vomiting, diarrhea, change in mentation, falling, weakness, bleeding.     DIET:  FULL Liquid diet     ACTIVITY:  Activity as tolerated    WOUND CARE: NONE  EQUIPMENT needed:  NONE    INCIDENTAL FINDINGS:  NONE    GOALS OF CARE:  X  Eventual return to home/independent/assisted living     Long term SNF      Hospice     No rehospitalization     Patient condition at discharge:   Functional status    Poor    X  Deconditioned      Independent   Cognition    Lucid   X  Forgetful (some sensescence)     Dementia   Catheters/lines (plus indication)    Woody     PICC      PEG         Code status    Full code    X  DNR        Physical examination at discharge  Visit Vitals  BP (!) 132/55 (BP 1 Location: Left upper arm, BP Patient Position: At rest)   Pulse 83   Temp 98.4 °F (36.9 °C)   Resp 19   Ht 5' 1\" (1.549 m)   Wt 49.4 kg (109 lb)   SpO2 97%   BMI 20.60 kg/m²     AO3  Lung Clear  CVS: RRR  Abd: soft NT ND   Ext: no edema     Pertinent imaging studies:  ECHO    Left Ventricle: Mild global hypokinesis present. The EF by visual approximation is 30 - 40%.   Mitral Valve: Mildly thickened leaflet. No annular dilation. Mild transvalvular regurgitation. Recent Labs     04/14/22  0351 04/13/22  0347   WBC 7.1 8.0   HGB 13.0 12.6   HCT 40.6 38.6    297     Recent Labs     04/14/22  0351 04/13/22  0347 04/12/22  0859   * 135* 135*   K 4.0 3.6 3.2*    102 102   CO2 17* 22 26   BUN 30* 22* 14   CREA 1.10* 1.13* 0.93   * 96 109*   CA 9.6 9.9 9.8     Recent Labs     04/14/22  0351   AP 66   TP 7.5   ALB 2.4*   GLOB 5.1*     No results for input(s): INR, PTP, APTT, INREXT in the last 72 hours. No results for input(s): FE, TIBC, PSAT, FERR in the last 72 hours. No results for input(s): PH, PCO2, PO2 in the last 72 hours. No results for input(s): CPK, CKMB in the last 72 hours. No lab exists for component: TROPONINI  No components found for: [place]    Chronic Diagnoses:    Problem List as of 4/14/2022 Date Reviewed: 4/12/2022          Codes Class Noted - Resolved    * (Principal) Perforated gastric ulcer ([facility] [address] ICD-10-CM: K25.5  ICD-9-CM: 531.50  4/6/2022 - Present        RESOLVED: GI bleeding ICD-10-CM: K92.2  ICD-9-CM: 578.9  6/16/2011 - 6/19/2011              Time spent on discharge related activities today greater than 30 minutes.       Signed:  Ashleigh Roberto MD                 Hospitalist, Internal Medicine      Cc: Олег Richard DO

## 2022-04-14 NOTE — PROGRESS NOTES
Transition of Care  RUR: 12%    Ms. Preston Xiao and her daughter, Pastor Navarro, were informed that Ms. Preston Xiao was able to be discharged today. Ms. Mello Johnson was agreeable with discharge to [facility]. She requested an ambulance transport. She was informed that if Medicare denies the ambulance transport, she will need to appeal the denial to Medicare. She repeated back the instructions. Edmar Candacejhon was called at [facility]. She requested a 3:30pm discharge. [facility] was called. A 3:00pm discharge was arranged. Nursing was informed of the 3:00pm discharge. Ms. Preston Xiao and Pastor Navarro were informed of the 3:00pm discharge. The discharge packet was initiated and given to nursing. Medicare pt has received, reviewed, and signed 2nd IM letter informing them of their right to appeal the discharge. Signed copied has been placed on pt bedside chart.   Signed By: Jarrod Rajan LCSW     April 14, 2022

## 2022-04-14 NOTE — PROGRESS NOTES
TRANSFER - OUT REPORT:    Verbal report given to KAM Negron(name) on Florentino Crockett  being transferred to [place] (unit) for routine progression of care       Report consisted of patients Situation, Background, Assessment and   Recommendations(SBAR). Information from the following report(s) SBAR, Kardex, ED Summary, Procedure Summary, Intake/Output, MAR, Recent Results and Med Rec Status was reviewed with the receiving nurse. Lines:   Peripheral IV 04/12/22 Anterior;Proximal;Right Forearm (Active)   Site Assessment Clean, dry, & intact 04/13/22 2113   Phlebitis Assessment 0 04/13/22 2113   Infiltration Assessment 0 04/13/22 2113   Dressing Status Clean, dry, & intact 04/13/22 2113   Dressing Type Tape;Transparent 04/13/22 2113   Hub Color/Line Status Pink;Flushed;Capped 04/13/22 2113   Action Taken Open ports on tubing capped 04/13/22 2113   Alcohol Cap Used Yes 04/13/22 2113        Opportunity for questions and clarification was provided.       Patient transported with:   [employer]

## 2022-04-14 NOTE — DISCHARGE INSTRUCTIONS
Take Diflucan and Augmentin for 7 more days    Full liquid diet for 1 more week until seen in the clinic    Follow-up next week for staple removal.    Take twice daily acid medication for the next 2 weeks. Will need long-term acid medication. No more than 15 pound lifting for the next 2 weeks. Tylenol as needed for pain control. Discharging provider: Miles Liang MD    Primary care provider: Derrick Barajas DO    80 y.o lady w/ dementia, HTN, history of pacemaker placement, who is here for perforated gastric ulcer s/p open surgical repair. HTN:  -On norvasc/HCTZ, will switch to coreg and cozaar  - orthostatic vitals improved      Pulmonary edema: due to acute diastolic/systolic CHF NYHA III  -IV diuresis held  -echo w/ severe hypertrophy and diastolic/systolic dysfunction. EF 30-40%  - holding home meds  -Per daughter, the patient had a stress test about 2 years ago  -Appreciate discussion with Cardiology, will stop norvasc/hctz. Will put the patient on coreg/statin. Continue losartan  - monitor BP     Hypokalemia-resolved      Perforated gastric ulcer s/p open repair 4/6:   -Appreciate surgery, continue full liquids for 1 more week. PPI BID. Augmentin/diflucan. Cleared from surgery standpoint for discharge. Outpatient follow up with Surgery in 1 week     Rheumatoid arthritis- Plaquenil 200 mg. Spoke to surgery, hold prednisone for 1 more week and then restart Prednisone 5mg daily   Anxiety/depression: takes zoloft 100 mg/remeron 7.5 mg  Dementia: on Aricept 10mg     DNR.          FOLLOW-UP CARE RECOMMENDATIONS:    APPOINTMENTS:  Follow-up Information     Follow up With Specialties Details Why Contact Info    [address]    Alfredito Galloway MD General Surgery, Bariatrics In 1 week for staple removal  [address] [facility]  [address]      Derrick Barajas DO Family Medicine   [address]  [phone]              It is very important that you keep follow-up appointment(s). Bring discharge papers, medication list (and/or medication bottles) to follow-up appointments for review by outpatient provider(s). FOLLOW-UP TESTS RECOMMENDED:     - FULL Liquid diet for 1 week   - surgical follow up in 1 week for staple removal  - resume prednisone 5mg daily after 1 week    ONGOING TREATMENT PLAN: as above    PENDING TEST RESULTS:  At the time of discharge the following test results are still pending: NONE  Please review these results as they become available. Specific symptoms to watch for: chest pain, shortness of breath, fever, chills, nausea, vomiting, diarrhea, change in mentation, falling, weakness, bleeding. DIET:  FULL Liquid diet     ACTIVITY:  Activity as tolerated    WOUND CARE: NONE  EQUIPMENT needed:  NONE    INCIDENTAL FINDINGS:  NONE    GOALS OF CARE:  X  Eventual return to home/independent/assisted living     Long term SNF      Hospice     No rehospitalization     Patient condition at discharge:   Functional status    Poor    X  Deconditioned      Independent   Cognition    Lucid   X  Forgetful (some sensescence)     Dementia   Catheters/lines (plus indication)    Woody     PICC      PEG         Code status    Full code    X  DNR    . . . . . . . . . . . . . . . . . . . . . . . . . . . . . . . . . . . . . . . . . . . . . . . . . . . . . . . . . . . . . . . . . . . . . . . Isma Parker      CHRONIC MEDICAL CONDITIONS:  Problem List as of 4/14/2022 Date Reviewed: 4/12/2022          Codes Class Noted - Resolved    * (Principal) [facility] ([facility] ICD-10-CM: K25.5  ICD-9-CM: 531.50  4/6/2022 - Present        RESOLVED: GI bleeding ICD-10-CM: K92.2  ICD-9-CM: 578.9  6/16/2011 - 6/19/2011

## 2022-04-19 ENCOUNTER — TELEPHONE (OUTPATIENT)
Dept: SURGERY | Age: 87
End: 2022-04-19

## 2022-04-19 NOTE — TELEPHONE ENCOUNTER
Bryson South from [facility] called stating that the patient is having watery, brown stool. Patient's bowel sounds are normal. Bryson South would like a call back to discuss this further.      CB: [phone]

## 2022-04-19 NOTE — TELEPHONE ENCOUNTER
Called Vashti back and she was gone for the day, spoke with [place].  Advised per MD no probiotic but she can try Imodium, OTC.

## 2022-04-19 NOTE — TELEPHONE ENCOUNTER
Spoke with Helen Woodson. Patient has had 5 loose, brown, watery stools in the past 2 hours. No other concerns or complaints. Nurse Helen Woodson wanted to make MD aware and see if there was anything to do for patient? Probiotic? Rec anything to hep bulk up stool?

## 2022-04-21 ENCOUNTER — OFFICE VISIT (OUTPATIENT)
Dept: SURGERY | Age: 87
End: 2022-04-21
Payer: MEDICARE

## 2022-04-21 VITALS
SYSTOLIC BLOOD PRESSURE: 100 MMHG | OXYGEN SATURATION: 95 % | TEMPERATURE: 98.4 F | HEART RATE: 94 BPM | HEIGHT: 61 IN | BODY MASS INDEX: 20.6 KG/M2 | DIASTOLIC BLOOD PRESSURE: 65 MMHG

## 2022-04-21 DIAGNOSIS — Z09 POSTOPERATIVE EXAMINATION: Primary | ICD-10-CM

## 2022-04-21 PROCEDURE — 99024 POSTOP FOLLOW-UP VISIT: CPT | Performed by: NURSE PRACTITIONER

## 2022-04-21 NOTE — PROGRESS NOTES
Subjective:      Jorge Mc is a 80 y.o. female presents for postop care following diagnostic lap, open laparotomy, suture repair of perforated gastric ulcer and omental patch. Appetite is fair eating a regular diet she is tolerating liquid diet  Bowel movements are diarrhea. No complaints of pain. Patient complains of nausea. This is not new. She has been nauseated before surgery. Ms. Jackie Mcrae has a reminder for a \"due or due soon\" health maintenance. I have asked that she contact her primary care provider for follow-up on this health maintenance. Objective:     Visit Vitals  /65 (BP 1 Location: Left arm, BP Patient Position: Sitting, BP Cuff Size: Small adult)   Pulse 94   Temp 98.4 °F (36.9 °C) (Oral)   Ht 5' 1\" (1.549 m)   SpO2 95%   BMI 20.60 kg/m²       General:  alert, cooperative   Abdomen: soft, non-tender   Incision:   healing well, no drainage, no erythema, staples removed. , no dehiscence, incision well approximated. Assessment:     Doing well postoperatively. Plan:   May advance diet to soft foods for 1 week then soft meats for 1 week. Instructions given to family member. She will pass along to rehab. Follow-up in 2 weeks. May be virtual visit. Keep incision clean and dry.

## 2022-04-21 NOTE — PATIENT INSTRUCTIONS
Shopping Lists    Soft and Mushy 1st week  In addition to everything on the Bariatric Liquid diet, you may add these foods to your diet. Protein - include with every meal   Egg or egg substitute     Low fat or fat-free cottage cheese     Low fat or fat-free yogurt    Low fat Greek yogurt    Fat-free, 1% milk, or Lactaid milk    Low-fat or vegetarian refried beans    Well-cooked beans and lentils   Fat-free or 2% reduced-fat cheese    Hummus    Low fat soup     Snacks/Other Options:   Whole wheat crackers   Sugar free fudgsicles    Sugar free cocoa    No sugar added pudding         Fruits and Vegetables   Applesauce    Canned fruit    Fresh soft peeled fruits (melons, banana, avocado, berries)   Any soft cooked vegetables    Mashed potatoes, Sweet potatoes, baked potatoes (no skin)  Condiments   Fat free non-stick spray   Herbs and spices   Lite butter, margarine, canola oil, olive oil   Reduced-fat or fat-free [place]   Reduced-fat or fat-free salad dressing   Reduced-fat or fat-free cream cheese   Reduced-fat or fat-free sour cream   Lemon juice   Salt, pepper, mustard, ketchup, salsa    Prepare food to the appropriate texture. Sample Meal Plan:  Soft and Mushy    Breakfast ½ cup plain oatmeal with protein powder. Add cinnamon, nutmeg, Splenda brown sugar as desired for flavor 20-25 grams protein   Snack (optional) High protein gelatin (recipe on www.[email or web address] com) 10 grams protein   Lunch ½ cup low fat cottage cheese or [place] yogurt with soft fruit 10 - 15 grams protein    Snack (optional) High protein pudding or high protein popsicle (recipe on www.[email or web address] com) 10 grams protein   Dinner ¼ cup low-fat well cooked beans with low-fat cheese sprinkled on top  ¼ cup no sugar added applesauce (can sprinkle protein powder) 5-8 grams protein  5-10  grams protein             Soft meats  Week #2    Shopping Lists      Protein - include with every meal   Tuna packed in water  [place] (canned, frozen, fresh)    White flaky fish (ana maria, cod, flounder, tilapia)    Canned chicken packed in water     96-99% fat free thinly sliced deli meat (ham, turkey, chicken)    Silken Tofu    Skinless turkey or chicken (prepare to a soft texture)    Lean ground meat   Lean pork (cooked until very tender, cut into small pieces)     Sample Meal Plan:  Moist Meats    Breakfast ½ cup soft cooked eggs (2) 16 grams protein   Snack (optional) Low-fat string cheese 5 grams protein   Lunch 2 slices of lean deli turkey (2 oz.), ¼ cup soft fruit or  ½ cup tuna salad made with low-fat mayonnaise 14 grams protein   14 grams protein   Snack (optional) Greek yogurt or low-fat cottage cheese or low-fat string cheese 8-15 grams protein   Dinner Soft/flaky fish (2 oz.)  ¼ cup soft cooked vegetables 14 grams protein

## 2022-04-21 NOTE — PROGRESS NOTES
1. Have you been to the ER, urgent care clinic since your last visit? Hospitalized since your last visit? No    2. Have you seen or consulted any other health care providers outside of the [address] [place] [address] since your last visit? Include any pap smears or colon screening.  No

## 2022-06-03 ENCOUNTER — TELEPHONE (OUTPATIENT)
Dept: SURGERY | Age: 87
End: 2022-06-03

## 2022-06-03 NOTE — TELEPHONE ENCOUNTER
Dietition that works at the home that the pt lives in, [place], called on behalf of pt looking to follow up with Dr. Alethea Candelaria nurse about the pt's surgery in April. Requesting to speak to nurse.

## 2022-06-09 NOTE — TELEPHONE ENCOUNTER
I called and left Rachel Fox a message. I reminded her of her call to us. I also reminded her this is our third call to you to follow up. Please call back.

## 2022-06-09 NOTE — TELEPHONE ENCOUNTER
Several attempts have been made to reach the home dietician and a request for a return call was requested each time.

## (undated) DEVICE — TROCAR ENDOSCP L100MM DIA5MM BLDELSS STBL SL OBT RADLUC

## (undated) DEVICE — GLOVE SURG SZ 65 L12IN FNGR THK94MIL STD WHT LTX FREE

## (undated) DEVICE — SUTURE VCRL SZ 2-0 L27IN ABSRB UD L26MM SH 1/2 CIR J417H

## (undated) DEVICE — SUTURE ETHLN SZ 2-0 L18IN NONABSORBABLE BLK L26MM FS 3/8 664G

## (undated) DEVICE — SUTURE VCRL SZ 3-0 L18IN ABSRB UD L26MM SH 1/2 CIR J864D

## (undated) DEVICE — TROCAR ENDOSCP L100MM DIA5MM BLDELSS STBL SL THRD OPT VW

## (undated) DEVICE — GARMENT,MEDLINE,DVT,INT,CALF,MED, GEN2: Brand: MEDLINE

## (undated) DEVICE — YANKAUER,POOLE TIP,STERILE,50/CS: Brand: MEDLINE

## (undated) DEVICE — GLOVE SURG SZ 7 L12IN FNGR THK79MIL GRN LTX FREE

## (undated) DEVICE — SUTURE MCRYL SZ 4-0 L27IN ABSRB UD L19MM PS-2 1/2 CIR PRIM Y426H

## (undated) DEVICE — 4-PORT MANIFOLD: Brand: NEPTUNE 2

## (undated) DEVICE — SUTURE PDS II SZ 0 L27IN ABSRB VLT L26MM CT-2 1/2 CIR Z334H

## (undated) DEVICE — GENERAL LAPAROSCOPY - SMH: Brand: MEDLINE INDUSTRIES, INC.

## (undated) DEVICE — DRAIN SURG 19FR 0.25IN SIL RND W/ TRCR INDIC DOT RADPQ FULL

## (undated) DEVICE — DERMABOND SKIN ADH 0.7ML -- DERMABOND ADVANCED 12/BX

## (undated) DEVICE — Device

## (undated) DEVICE — RESERVOIR,SUCTION,100CC,SILICONE: Brand: MEDLINE

## (undated) DEVICE — NEEDLE SPNL 22GA L3.5IN BLK HUB S STL REG WALL FIT STYL W/

## (undated) DEVICE — FILTER SMK EVAC FLO CLR MEGADYNE

## (undated) DEVICE — SYSTEM EVAC SMOKE LAPARSCOPIC